# Patient Record
Sex: FEMALE | Race: BLACK OR AFRICAN AMERICAN | NOT HISPANIC OR LATINO | Employment: STUDENT | URBAN - METROPOLITAN AREA
[De-identification: names, ages, dates, MRNs, and addresses within clinical notes are randomized per-mention and may not be internally consistent; named-entity substitution may affect disease eponyms.]

---
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
## 2020-01-01 ENCOUNTER — NURSE TRIAGE (OUTPATIENT)
Dept: CALL CENTER | Facility: HOSPITAL | Age: 20
End: 2020-01-01

## 2020-01-01 NOTE — TELEPHONE ENCOUNTER
Reason for Disposition  • New-onset mild wheezing    Additional Information  • Negative: Wheezing and life-threatening allergic reaction to similar substance in the past  • Negative: Wheezing starts suddenly after allergic food, new medicine or bee sting  • Negative: Severe difficulty breathing (struggling for each breath, unable to speak or cry, making grunting noises with each breath, severe retractions) (Triage tip: Listen to the child's breathing.)  • Negative: Passed out  • Negative: Bluish (or gray) lips or face now  • Negative: Sounds like a life-threatening emergency to the triager  • Negative: Bronchiolitis or RSV diagnosed in last 2 weeks  • Negative: Previous diagnosis of asthma (or RAD) OR regular use of asthma medicines for wheezing  • Negative: [1] Age < 2 years AND [2] given albuterol inhaler or neb (Lane: Salbutamol) for home treatment within the last 2 weeks BUT [3] no diagnosis given and no history of regular use of asthma meds  • Negative: [1] Age > 2 years AND [2] given albuterol inhaler or neb (Lane: Salbutamol) for home treatment within the last 2 weeks BUT [3] no diagnosis given  • Negative: Doesn't fit the description of wheezing  • Negative: Severe wheezing (e.g., wheezing can be heard across the room)  • Negative: Choked on small object or food recently  • Negative: [1] Age < 12 weeks AND [2] fever 100.4 F (38.0 C) or higher rectally  • Negative: Age < 6 months old with wheezing  • Negative: [1] Age < 1 year AND [2] difficulty feeding AND [3] fluid intake < 50% of normal amount  • Negative: [1] Difficulty breathing (> 1 year old) AND [2] not severe (Triage tip: Listen to the child's breathing.)  • Negative: [1] Difficulty breathing (< 1 year old) AND [2] not severe AND [3] not relieved by cleaning out the nose (Triage tip: Listen to the child's breathing.)  • Negative: Ribs are pulling in with each breath (retractions)  • Negative: [1] Lips or face have turned bluish BUT [2] not  "present now  • Negative: Rapid breathing (Breaths/min > 60 if < 2 mo;  > 50 if 2-12 mo;  > 40 if 1-5 years old; > 30 if 6-11 years; and > 20 if > 12 years old)  • Negative: [1] Drinking very little AND [2] signs of dehydration (no urine > 12 hours, very dry mouth, no tears, etc.)  • Negative: [1] Fever AND [2] > 105 F (40.6 C) by any route OR axillary > 104 F (40 C)  • Negative: [1] Fever AND [2] weak immune system (sickle cell disease, HIV, splenectomy, chemotherapy, organ transplant, chronic oral steroids, etc)  • Negative: High-risk child (e.g., underlying heart, lung or severe neuromuscular disease)  • Negative: [1] Age < 1 year old AND [2] history of prematurity (< 37 weeks) or NICU stay  • Negative: Child sounds very sick or weak to the triager  • Negative: [1] Age< 1 year AND [2] refuses to breast or bottle feed for 2 or more feedings  • Negative: [1] Albuterol prescribed for this child in the past AND [2] caller has it and wants to use it for mild wheezing AND [3] diagnosis unknown    Answer Assessment - Initial Assessment Questions  Note to Triager - Respiratory Distress: Always rule out respiratory distress (also known as working hard to breathe or shortness of breath). Listen for grunting, stridor, wheezing, tachypnea in these calls. How to assess: Listen to the child's breathing early in your assessment. Reason: What you hear is often more valid than the caller's answers to your triage questions.  1. ONSET: \"When did the wheezing begin?\"        tonight  2. RESPIRATORY STATUS: \"Describe your child's breathing. What does it sound like?\" (e.g., wheezing, stridor, grunting, weak cry, unable to speak, retractions, rapid rate, cyanosis)      Denies retractions etc  3. FEEDING STATUS:  \"Is your child having difficulty with breast or bottle feeding?\"  If so, ask:  \"How long can he feed without stopping to take a breath?\"     Eating ok  4. ASSOCIATED VIRAL INFECTION: \"Does your child also have a cold, cough or " "fever?\"      Has ear infection, dx with pneumox 1  5. ASSOCIATED ALLERGIES: \"Does your child also have any allergies?\"       n/a  6. RECURRENT EPISODES: \"Has your child had other attacks of wheezing?\" If so, ask: \"When was the last time?\" and \"What happened that time?\"       no  7. FAMILY HISTORY: \"Does anyone in your family have asthma?\"       no  8. CHILD'S APPEARANCE: \"How sick is your child acting?\" \" What is he doing right now?\" If asleep, ask: \"How was he acting before he went to sleep?\"      resting    Protocols used: WHEEZING - OTHER THAN ASTHMA-PEDIATRIC-AH      "

## 2021-08-18 ENCOUNTER — OFFICE VISIT (OUTPATIENT)
Dept: OBSTETRICS AND GYNECOLOGY | Age: 21
End: 2021-08-18

## 2021-08-18 VITALS
DIASTOLIC BLOOD PRESSURE: 76 MMHG | BODY MASS INDEX: 24.55 KG/M2 | HEIGHT: 61 IN | WEIGHT: 130 LBS | SYSTOLIC BLOOD PRESSURE: 118 MMHG

## 2021-08-18 DIAGNOSIS — Z20.2 POSSIBLE EXPOSURE TO STD: Primary | ICD-10-CM

## 2021-08-18 PROCEDURE — 99385 PREV VISIT NEW AGE 18-39: CPT | Performed by: OBSTETRICS & GYNECOLOGY

## 2021-08-18 RX ORDER — LEVONORGESTREL AND ETHINYL ESTRADIOL 0.1-0.02MG
1 KIT ORAL DAILY
Qty: 84 TABLET | Refills: 3 | Status: SHIPPED | OUTPATIENT
Start: 2021-08-18 | End: 2022-07-25

## 2021-08-18 NOTE — PROGRESS NOTES
Routine Annual Visit    2021    Patient: Elsa Thrasher          MR#:[id number]      Chief Complaint   Patient presents with   • Gynecologic Exam     New pt, AE today. Interested in OCP. Pt states cycles are irregular at times.        History of Present Illness    20 y.o. female  who presents for annual exam.   She has some menstrual irregularity, occasionally skips a month but not usual  They are somewhat heavy and has significant cramping at times    She is going off to school at [employer] tomorrow.  She has been sexually active with one partner, no concern for STIs but does accept screening today.    Health Maintenance  Gardasil yes did receive      Patient's last menstrual period was 2021.  Obstetric History:  OB History        0    Para   0    Term   0       0    AB   0    Living   0       SAB   0    TAB   0    Ectopic   0    Molar   0    Multiple   0    Live Births   0               Menstrual History:     Patient's last menstrual period was 2021.       ________________________________________  There is no problem list on file for this patient.      History reviewed. No pertinent past medical history.    Family History   Problem Relation Age of Onset   • Hypertension Father        History reviewed. No pertinent surgical history.    Social History     Tobacco Use   Smoking Status Never Smoker   Smokeless Tobacco Never Used       currently has no medications in their medication list.  ________________________________________      Review of Systems   Constitutional: Negative for fever and unexpected weight change.   Respiratory: Negative for shortness of breath.    Cardiovascular: Negative for chest pain.   Gastrointestinal: Negative for abdominal pain, constipation and diarrhea.   Genitourinary: Positive for menstrual problem. Negative for frequency and urgency.   Hematological: Negative for adenopathy.   Psychiatric/Behavioral: Negative for dysphoric mood.       Objective   Physical  "Exam    /76   Ht 154.9 cm (61\")   Wt 59 kg (130 lb)   LMP 08/06/2021   Breastfeeding No   BMI 24.56 kg/m²    BP Readings from Last 3 Encounters:   08/18/21 118/76   12/04/20 127/82   05/18/18 102/78 (22 %, Z = -0.76 /  93 %, Z = 1.46)*     *BP percentiles are based on the 2017 AAP Clinical Practice Guideline for girls      Wt Readings from Last 3 Encounters:   08/18/21 59 kg (130 lb)   05/18/18 60.3 kg (133 lb) (68 %, Z= 0.46)*   03/31/17 61.1 kg (134 lb 12.8 oz) (74 %, Z= 0.63)*     * Growth percentiles are based on [facility] [place] (Girls, 2-20 Years) data.         BMI: Body mass index is 24.56 kg/m².       General:   alert, appears stated age and cooperative   Neck: No thyromegaly or LAD, no carotid bruit noted   Heart:: regular rate and rhythm, S1, S2 normal, no murmur, click, rub or gallop   Lungs: normal respiratory effort and auscultation   Abdomen: soft, non-tender, without masses or organomegaly   extremities No calf TTP, no edema     Assessment:    normal annual exam   STI screen  Contraception  dysmenorrhea    Plan:    Plan     []  Mammogram request made  []  PAP done  []  Labs:   [x]  GC/Chl/TV  []  DEXA scan   []  Referral for colonoscopy:     We reviewed form of contraception today and also treatment for dysmenorrhea.  Plan to start OCPs, but also recommended IUD or Nexplanon is more effective form of contraception also treatment for her dysmenorrhea.    Counseling  [x]  Nutrition  [x]  Physical activity/regular exercise   [x]  Healthy weight  []  Injury prevention  []  Smoking cessation  [x]  Substance misuse/abuse  [x]  Sexual behavior  [x]  STD prevention  [x]  Contraception  []  Dental health  []  Mental health  []  Immunization  []  Encouraged SBMAHESH Guzman MD  08/18/2021  15:12 EDT    "

## 2021-08-20 LAB
C TRACH RRNA SPEC QL NAA+PROBE: NEGATIVE
N GONORRHOEA RRNA SPEC QL NAA+PROBE: NEGATIVE
T VAGINALIS DNA SPEC QL NAA+PROBE: NEGATIVE

## 2021-08-22 NOTE — PROGRESS NOTES
Please notify that screening for gonorrhea, chlamydia, trichomonas was negative    Dinorah Guzman MD  8/22/2021  09:29 EDT

## 2021-08-23 ENCOUNTER — TELEPHONE (OUTPATIENT)
Dept: OBSTETRICS AND GYNECOLOGY | Age: 21
End: 2021-08-23

## 2021-12-11 ENCOUNTER — IMMUNIZATION (OUTPATIENT)
Dept: VACCINE CLINIC | Facility: HOSPITAL | Age: 21
End: 2021-12-11

## 2021-12-11 PROCEDURE — 91300 HC SARSCOV02 VAC 30MCG/0.3ML IM: CPT | Performed by: INTERNAL MEDICINE

## 2021-12-11 PROCEDURE — 0004A HC ADM SARSCOV2 30MCG/0.3ML BOOSTER: CPT | Performed by: INTERNAL MEDICINE

## 2022-07-25 RX ORDER — LEVONORGESTREL AND ETHINYL ESTRADIOL 0.1-0.02MG
KIT ORAL
Qty: 84 TABLET | Refills: 3 | Status: SHIPPED | OUTPATIENT
Start: 2022-07-25

## 2022-07-25 NOTE — TELEPHONE ENCOUNTER
Caller: Elsa Thrasher    Relationship: Self    Best call back number: [phone]    Requested Prescriptions:   Requested Prescriptions     Pending Prescriptions Disp Refills   • Lessina 0.1-20 MG-MCG per tablet [Pharmacy Med Name: LESSINA-28 TABLET] 84 tablet 3     Sig: TAKE ONE TABLET BY MOUTH DAILY        Pharmacy where request should be sent:   [facility]  [id number] [facility], [place] [id number]  PH. [phone]    Additional details provided by patient: PT IS OUT OF PRESCRIPTION, SHLD HAVE STARTED 7/24/22  Does the patient have less than a 3 day supply:  [x] Yes  [] No    Marie Cuello Rep   07/25/22 08:55 EDT

## 2022-08-17 ENCOUNTER — OFFICE VISIT (OUTPATIENT)
Dept: OBSTETRICS AND GYNECOLOGY | Age: 22
End: 2022-08-17

## 2022-08-17 VITALS
WEIGHT: 129 LBS | HEIGHT: 61 IN | BODY MASS INDEX: 24.35 KG/M2 | SYSTOLIC BLOOD PRESSURE: 116 MMHG | DIASTOLIC BLOOD PRESSURE: 74 MMHG

## 2022-08-17 DIAGNOSIS — Z30.41 ENCOUNTER FOR SURVEILLANCE OF CONTRACEPTIVE PILLS: ICD-10-CM

## 2022-08-17 DIAGNOSIS — Z01.419 ENCOUNTER FOR GYNECOLOGICAL EXAMINATION WITHOUT ABNORMAL FINDING: Primary | ICD-10-CM

## 2022-08-17 PROCEDURE — 99395 PREV VISIT EST AGE 18-39: CPT | Performed by: OBSTETRICS & GYNECOLOGY

## 2022-08-17 NOTE — PROGRESS NOTES
Routine Annual Visit    2022    Patient: Elsa Thrasher          MR#:[id number]      Chief Complaint   Patient presents with   • Gynecologic Exam     AE Today. Pt has no complaints       History of Present Illness    21 y.o. female  who presents for annual exam.   [employer], leaves next Saturday. Living off campus.   She has been sexually active this year, using condoms.  Happy with OCPs but considering Nexplanon.  She plans to go into sports management, and med management.        Patient's last menstrual period was 2022 (exact date).  Obstetric History:  OB History        0    Para   0    Term   0       0    AB   0    Living   0       SAB   0    IAB   0    Ectopic   0    Molar   0    Multiple   0    Live Births   0               Menstrual History:     Patient's last menstrual period was 2022 (exact date).       ________________________________________  There is no problem list on file for this patient.      History reviewed. No pertinent past medical history.    Family History   Problem Relation Age of Onset   • Hypertension Father    • Breast cancer Maternal Grandmother    • Prostate cancer Maternal Grandfather    • Ovarian cancer Neg Hx    • Uterine cancer Neg Hx    • Colon cancer Neg Hx        History reviewed. No pertinent surgical history.    Social History     Tobacco Use   Smoking Status Never Smoker   Smokeless Tobacco Never Used       has a current medication list which includes the following prescription(s): lessina.  ________________________________________      Review of Systems   Constitutional: Negative for fever and unexpected weight change.   Respiratory: Negative for shortness of breath.    Cardiovascular: Negative for chest pain.   Gastrointestinal: Negative for abdominal pain, constipation and diarrhea.   Genitourinary: Negative for frequency and urgency.   Hematological: Negative for adenopathy.   Psychiatric/Behavioral: Negative for dysphoric mood.       Objective  "  Physical Exam    /74   Ht 154.9 cm (61\")   Wt 58.5 kg (129 lb)   LMP 08/16/2022 (Exact Date)   Breastfeeding No   BMI 24.37 kg/m²    BP Readings from Last 3 Encounters:   08/17/22 116/74   08/18/21 118/76   12/04/20 127/82      Wt Readings from Last 3 Encounters:   08/17/22 58.5 kg (129 lb)   08/18/21 59 kg (130 lb)   05/18/18 60.3 kg (133 lb) (68 %, Z= 0.46)*     * Growth percentiles are based on CDC (Girls, 2-20 Years) data.         BMI: Body mass index is 24.37 kg/m².       General:   alert, appears stated age and cooperative   Neck: No thyromegaly or LAD   Abdomen: soft, non-tender, without masses or organomegaly   Breast: inspection negative, no nipple discharge or bleeding, no masses or nodularity palpable   Urethra and bladder: urethral meatus normal; bladder nontender to palpation;   Vulva: normal, Bartholin's, Urethra, [place]'s normal   Vagina: normal mucosa, normal discharge, scant blood   Cervix: no lesions and nulliparous appearance   Uterus: normal size and anteverted   Adnexa: normal adnexa and no mass, fullness, tenderness       Assessment:    normal annual exam   Contraception management  Screening for STI    Plan:    Plan     []  Mammogram request made  [x]  PAP done  []  Labs:   [x]  GC/Chl/TV  []  DEXA scan   []  Referral for colonoscopy:     Information given regarding Nexplanon.  She may want to switch from OCPs to Nexplanon.    Counseling  [x]  Nutrition  [x]  Physical activity/regular exercise   [x]  Healthy weight  []  Injury prevention  []  Smoking cessation  []  Substance misuse/abuse  [x]  Sexual behavior  [x]  STD prevention  [x]  Contraception  []  Dental health  []  Mental health  []  Immunization  [x]  Encouraged SBE      Dinorah Guzman MD  08/17/2022  13:10 EDT    "

## 2022-08-23 LAB
C TRACH RRNA CVX QL NAA+PROBE: NEGATIVE
CONV .: NORMAL
CYTOLOGIST CVX/VAG CYTO: NORMAL
CYTOLOGY CVX/VAG DOC CYTO: NORMAL
CYTOLOGY CVX/VAG DOC THIN PREP: NORMAL
DX ICD CODE: NORMAL
HIV 1 & 2 AB SER-IMP: NORMAL
Lab: NORMAL
N GONORRHOEA RRNA CVX QL NAA+PROBE: NEGATIVE
OTHER STN SPEC: NORMAL
RECOM F/U CVX/VAG CYTO: NORMAL
STAT OF ADQ CVX/VAG CYTO-IMP: NORMAL
T VAGINALIS RRNA SPEC QL NAA+PROBE: NEGATIVE

## 2022-08-24 NOTE — PROGRESS NOTES
Please notify:  Your Pap was insufficient, there were not enough cells for the pathologist to analyze.  I would recommend repeating the Pap in about 3 or 4 months.  Testing for gonorrhea, chlamydia, trichomonas was negative.    Dinorah Guzman MD  8/24/2022  12:58 EDT

## 2022-11-23 ENCOUNTER — OFFICE VISIT (OUTPATIENT)
Dept: OBSTETRICS AND GYNECOLOGY | Age: 22
End: 2022-11-23

## 2022-11-23 VITALS
DIASTOLIC BLOOD PRESSURE: 78 MMHG | WEIGHT: 124 LBS | HEIGHT: 61 IN | BODY MASS INDEX: 23.41 KG/M2 | SYSTOLIC BLOOD PRESSURE: 124 MMHG

## 2022-11-23 DIAGNOSIS — R87.615 ENCOUNTER FOR REPEAT PAP SMEAR DUE TO PREVIOUS INSUFF CERVICAL CELLS: Primary | ICD-10-CM

## 2022-11-23 NOTE — PROGRESS NOTES
"Subjective   Elsa Thrasher is a 22 y.o. female is being seen today for   Chief Complaint   Patient presents with   • Follow-up     Repeat pap due to not enough cells     .    History of Present Illness     Patient of Dr Guzman  Patient here for repeat pap due to insufficient cells  Gc/CHL was negative  No problems or concerns today      The following portions of the patient's history were reviewed and updated as appropriate: allergies, current medications, past family history, past medical history, past social history, past surgical history and problem list.    /78   Ht 154.9 cm (61\")   Wt 56.2 kg (124 lb)   LMP 11/08/2022 (Exact Date)   BMI 23.43 kg/m²         Review of Systems   Constitutional: Negative.    HENT: Negative.    Eyes: Negative.    Respiratory: Negative.    Cardiovascular: Negative.    Gastrointestinal: Negative.    Endocrine: Negative.    Genitourinary: Negative.    Musculoskeletal: Negative.    Skin: Negative.    Allergic/Immunologic: Negative.    Neurological: Negative.    Hematological: Negative.    Psychiatric/Behavioral: Negative.        Objective   Physical Exam  Constitutional:       Appearance: She is well-developed.   Genitourinary:     Vagina: Normal.      Cervix: No cervical motion tenderness, discharge or friability.      Comments: No bleeding following pap  Skin:     General: Skin is warm and dry.   Neurological:      Mental Status: She is alert and oriented to person, place, and time.           Assessment & Plan   Diagnoses and all orders for this visit:    1. Encounter for repeat Pap smear due to previous insuff cervical cells (Primary)  -     IGP,rfx Aptima HPV All Pth; Future      Repeat pap complete and sent  Follow up AE and PRN           Total time spent today with Elsa  was 20-29 minutes (level 3).  Greater than 50% of the time was spent coordinating care, answering her questions and counseling regarding pathophysiology of her presenting problem along with plans for any " diagnositc work-up and treatment.

## 2022-11-24 ENCOUNTER — DOCUMENTATION (OUTPATIENT)
Dept: INTERNAL MEDICINE | Facility: CLINIC | Age: 22
End: 2022-11-24

## 2022-11-24 NOTE — PROGRESS NOTES
"           Name: Elsa Thrasher  :  2000    Call Complaint/Concern:          Elsa Thrasher is a 22 y.o. female patient of Milan Whitlock MD who has called for:     Patient's mother: Hi Thrasher called regarding this patient.   Patient is not yet established in our office.     Advised via Doximity that medical recommendation can not be provided to unknown patient. Message read confirmation received.     Patient has upcoming appointment to establish care with Omi on 22.       Yonatan \"Billy\" Zachary, APRN   22    Objective:          Current Outpatient Medications:   •  Lessina 0.1-20 MG-MCG per tablet, TAKE ONE TABLET BY MOUTH DAILY, Disp: 84 tablet, Rfl: 3    Office Visit on 2022   Component Date Value Ref Range Status   • Diagnosis 2022 Comment   Final    Comment: UNSATISFACTORY FOR EVALUATION.  SPECIMEN REPROCESSED FOR INTERPRETATION USING GLACIAL ACETIC ACID  (GAA).     • Recommendation: 2022 Comment   Final    Suggest follow up as clinically appropriate.   • Specimen adequacy: 2022 Comment   Final    Comment: Specimen processed and examined but unsatisfactory for evaluation of  epithelial abnormality because of insufficient cellularity.     • Clinician Provided ICD-10: 2022 Comment   Final    Z01.419   • Performed by: 2022 Comment   Final    Marjan Enamorado, Cytotechnologist (ASCP)   • QC reviewed by: 2022 Comment   Final    Tracy Elizondo, Supervisory Cytotechnologist (ASCP)   • . 2022 .   Final   • Note: 2022 Comment   Final    Comment: The Pap smear is a screening test designed to aid in the detection of  premalignant and malignant conditions of the uterine cervix.  It is not a  diagnostic procedure and should not be used as the sole means of detecting  cervical cancer.  Both false-positive and false-negative reports do occur.     • Method: 2022 Comment   Final    Comment: This liquid based ThinPrep(R) pap test was screened with " the  use of an image guided system.     • Conv .conv 08/17/2022 Comment   Final    Comment: The HPV DNA reflex criteria were not met with this specimen result  therefore, no HPV testing was performed.     • Chlamydia, Nuc. Acid Amp 08/17/2022 Negative  Negative Final   • Gonococcus by Nucleic Acid Amp 08/17/2022 Negative  Negative Final   • Trichomonas vaginosis 08/17/2022 Negative  Negative Final

## 2022-12-01 LAB
CONV .: NORMAL
CYTOLOGIST CVX/VAG CYTO: NORMAL
CYTOLOGY CVX/VAG DOC CYTO: NORMAL
CYTOLOGY CVX/VAG DOC THIN PREP: NORMAL
DX ICD CODE: NORMAL
HIV 1 & 2 AB SER-IMP: NORMAL
OTHER STN SPEC: NORMAL
STAT OF ADQ CVX/VAG CYTO-IMP: NORMAL

## 2022-12-19 ENCOUNTER — OFFICE VISIT (OUTPATIENT)
Dept: FAMILY MEDICINE CLINIC | Facility: CLINIC | Age: 22
End: 2022-12-19

## 2022-12-19 VITALS
HEART RATE: 88 BPM | RESPIRATION RATE: 18 BRPM | OXYGEN SATURATION: 98 % | HEIGHT: 62 IN | BODY MASS INDEX: 22.63 KG/M2 | SYSTOLIC BLOOD PRESSURE: 138 MMHG | WEIGHT: 123 LBS | DIASTOLIC BLOOD PRESSURE: 82 MMHG | TEMPERATURE: 98 F

## 2022-12-19 DIAGNOSIS — R59.9 SWOLLEN LYMPH NODES: Primary | ICD-10-CM

## 2022-12-19 DIAGNOSIS — R59.9 SWOLLEN LYMPH NODES: ICD-10-CM

## 2022-12-19 DIAGNOSIS — Z00.00 ANNUAL PHYSICAL EXAM: ICD-10-CM

## 2022-12-19 PROCEDURE — 99395 PREV VISIT EST AGE 18-39: CPT | Performed by: FAMILY MEDICINE

## 2022-12-19 NOTE — PROGRESS NOTES
Chief Complaint  establish care well check up    Subjective            Elsa Thrasher presents to [facility]  History of Present Illness    23 yo female present to establish care and annual visit. She is nw to me and this office.   Patient reporting that health is doing well overall.  Patient is here today for annual physical.  Eating a balanced diet.    Labs: Due for routine labs    Pap- normal 11/23/22, on birthcontrol  Immunization   Dental- need to make an appt.   Vision- stable         PHQ-2 Depression Screening  Little interest or pleasure in doing things? 0-->not at all   Feeling down, depressed, or hopeless? 0-->not at all   PHQ-2 Total Score 0     Social History     Socioeconomic History   • Marital status: Single   Tobacco Use   • Smoking status: Never   • Smokeless tobacco: Never   Vaping Use   • Vaping Use: Never used   Substance and Sexual Activity   • Alcohol use: No   • Drug use: No   • Sexual activity: Yes     Partners: Male     Birth control/protection: OCP, Condom         Review of Systems   Constitutional: Negative for chills and fever.   HENT: Negative for congestion, rhinorrhea, sneezing and sore throat.    Eyes: Negative for visual disturbance.   Respiratory: Negative for cough and shortness of breath.    Cardiovascular: Negative for chest pain.   Gastrointestinal: Negative for abdominal pain, constipation, diarrhea, nausea and vomiting.   Genitourinary: Negative for difficulty urinating, vaginal bleeding and vaginal discharge.   Musculoskeletal: Positive for arthralgias.        Knees   Skin: Negative for rash.   Neurological: Negative for dizziness and numbness.   Hematological: Does not bruise/bleed easily.   Psychiatric/Behavioral: Negative for dysphoric mood and sleep disturbance.         Objective   Vital Signs:   /82 (BP Location: Left arm, Patient Position: Sitting, Cuff Size: Adult)   Pulse 88   Temp 98 °F (36.7 °C) (Infrared)   Resp 18   Ht 157.5 cm  "(62\")   Wt 55.8 kg (123 lb)   SpO2 98%   BMI 22.50 kg/m²     Physical Exam  Constitutional:       General: She is not in acute distress.     Appearance: She is not ill-appearing.   HENT:      Head: Normocephalic.      Right Ear: Tympanic membrane normal.      Left Ear: Tympanic membrane normal.      Mouth/Throat:      Mouth: Mucous membranes are moist.      Pharynx: Oropharynx is clear. No oropharyngeal exudate.   Eyes:      Conjunctiva/sclera: Conjunctivae normal.   Cardiovascular:      Rate and Rhythm: Regular rhythm.      Heart sounds: Normal heart sounds.   Pulmonary:      Effort: No respiratory distress.      Breath sounds: Normal breath sounds. No wheezing.   Abdominal:      General: Bowel sounds are normal. There is no distension.      Palpations: Abdomen is soft.      Tenderness: There is no abdominal tenderness.   Musculoskeletal:      Cervical back: Neck supple.      Right lower leg: No edema.      Left lower leg: No edema.   Lymphadenopathy:      Cervical: No cervical adenopathy.      Lower Body: Right inguinal adenopathy present. Left inguinal adenopathy present.   Neurological:      Mental Status: She is alert and oriented to person, place, and time.   Psychiatric:         Mood and Affect: Mood normal.        Result Review :   The following data was reviewed by: Angi Braga MD on 12/19/2022:             Current Outpatient Medications:   •  Lessina 0.1-20 MG-MCG per tablet, TAKE ONE TABLET BY MOUTH DAILY, Disp: 84 tablet, Rfl: 3     Assessment and Plan    Diagnoses and all orders for this visit:    1. Swollen lymph nodes (Primary)  -     Cancel: CBC w AUTO Differential; Future  -     Cancel: Basic metabolic panel; Future  -     Basic metabolic panel; Future  -     CBC w AUTO Differential; Future  -     US Pelvis Complete; Future    2. Annual physical exam      Normal period and BM   Initaly pain full swollen lymph nodes. No longer tender  Seen by gyn a month ago, normal pap.   Cbc           The " patient was counseled regarding nutrition, physical activity, healthy weight, injury prevention, misuse of tobacco, alcohol and illicit drugs, mental health, immunizations, and screenings.               Follow Up   Return in about 1 year (around 12/19/2023) for Annual physical.  Patient was given instructions and counseling regarding her condition or for health maintenance advice. Please see specific information pulled into the AVS if appropriate.

## 2022-12-20 PROBLEM — R59.9 SWOLLEN LYMPH NODES: Status: ACTIVE | Noted: 2022-12-20

## 2022-12-20 LAB
BASOPHILS # BLD AUTO: 0 X10E3/UL (ref 0–0.2)
BASOPHILS NFR BLD AUTO: 0 %
BUN SERPL-MCNC: 12 MG/DL (ref 6–20)
BUN/CREAT SERPL: 16 (ref 9–23)
CALCIUM SERPL-MCNC: 9.6 MG/DL (ref 8.7–10.2)
CHLORIDE SERPL-SCNC: 104 MMOL/L (ref 96–106)
CO2 SERPL-SCNC: 21 MMOL/L (ref 20–29)
CREAT SERPL-MCNC: 0.73 MG/DL (ref 0.57–1)
EGFRCR SERPLBLD CKD-EPI 2021: 119 ML/MIN/1.73
EOSINOPHIL # BLD AUTO: 0.1 X10E3/UL (ref 0–0.4)
EOSINOPHIL NFR BLD AUTO: 1 %
ERYTHROCYTE [DISTWIDTH] IN BLOOD BY AUTOMATED COUNT: 12.8 % (ref 11.7–15.4)
GLUCOSE SERPL-MCNC: 89 MG/DL (ref 70–99)
HCT VFR BLD AUTO: 40.3 % (ref 34–46.6)
HGB BLD-MCNC: 12.9 G/DL (ref 11.1–15.9)
IMM GRANULOCYTES # BLD AUTO: 0 X10E3/UL (ref 0–0.1)
IMM GRANULOCYTES NFR BLD AUTO: 0 %
LYMPHOCYTES # BLD AUTO: 1.2 X10E3/UL (ref 0.7–3.1)
LYMPHOCYTES NFR BLD AUTO: 20 %
MCH RBC QN AUTO: 27.8 PG (ref 26.6–33)
MCHC RBC AUTO-ENTMCNC: 32 G/DL (ref 31.5–35.7)
MCV RBC AUTO: 87 FL (ref 79–97)
MONOCYTES # BLD AUTO: 0.3 X10E3/UL (ref 0.1–0.9)
MONOCYTES NFR BLD AUTO: 5 %
NEUTROPHILS # BLD AUTO: 4.5 X10E3/UL (ref 1.4–7)
NEUTROPHILS NFR BLD AUTO: 74 %
PLATELET # BLD AUTO: 328 X10E3/UL (ref 150–450)
POTASSIUM SERPL-SCNC: 4.1 MMOL/L (ref 3.5–5.2)
RBC # BLD AUTO: 4.64 X10E6/UL (ref 3.77–5.28)
SODIUM SERPL-SCNC: 139 MMOL/L (ref 134–144)
WBC # BLD AUTO: 6.1 X10E3/UL (ref 3.4–10.8)

## 2022-12-23 ENCOUNTER — TELEPHONE (OUTPATIENT)
Dept: FAMILY MEDICINE CLINIC | Facility: CLINIC | Age: 22
End: 2022-12-23

## 2022-12-23 NOTE — TELEPHONE ENCOUNTER
Caller: Elsa Thrasher    Relationship: Self    Best call back number:[phone]    What orders are you requesting (i.e. lab or imaging): ULTRASOUND OF PELVIS     In what timeframe would the patient need to come in: AS SOON AS POSSIBLE     Where will you receive your lab/imaging services: UNKNOWN

## 2022-12-23 NOTE — TELEPHONE ENCOUNTER
It is scheduled on 12/30, looks like they were able to schedule the appointment right after they called here about it.

## 2022-12-30 ENCOUNTER — HOSPITAL ENCOUNTER (OUTPATIENT)
Dept: ULTRASOUND IMAGING | Facility: HOSPITAL | Age: 22
Discharge: HOME | End: 2022-12-30

## 2022-12-30 DIAGNOSIS — R59.9 SWOLLEN LYMPH NODES: ICD-10-CM

## 2022-12-30 PROCEDURE — 76882 US LMTD JT/FCL EVL NVASC XTR: CPT

## 2023-05-15 ENCOUNTER — HOSPITAL ENCOUNTER (EMERGENCY)
Facility: HOSPITAL | Age: 23
Discharge: HOME | End: 2023-05-15
Attending: EMERGENCY MEDICINE
Payer: COMMERCIAL

## 2023-05-15 VITALS
HEART RATE: 99 BPM | SYSTOLIC BLOOD PRESSURE: 115 MMHG | BODY MASS INDEX: 22.08 KG/M2 | RESPIRATION RATE: 16 BRPM | DIASTOLIC BLOOD PRESSURE: 73 MMHG | OXYGEN SATURATION: 98 % | WEIGHT: 120 LBS | HEIGHT: 62 IN | TEMPERATURE: 98.3 F

## 2023-05-15 DIAGNOSIS — R11.2 NAUSEA AND VOMITING, UNSPECIFIED VOMITING TYPE: Primary | ICD-10-CM

## 2023-05-15 DIAGNOSIS — E86.0 DEHYDRATION: ICD-10-CM

## 2023-05-15 LAB
ALBUMIN SERPL-MCNC: 4.6 G/DL (ref 3.5–5.2)
ALBUMIN/GLOB SERPL: 1.3 G/DL
ALP SERPL-CCNC: 46 U/L (ref 39–117)
ALT SERPL W P-5'-P-CCNC: 14 U/L (ref 1–33)
ANION GAP SERPL CALCULATED.3IONS-SCNC: 12.3 MMOL/L (ref 5–15)
ANION GAP SERPL CALCULATED.3IONS-SCNC: 18.1 MMOL/L (ref 5–15)
AST SERPL-CCNC: 21 U/L (ref 1–32)
ATMOSPHERIC PRESS: 748.1 MMHG
B-HCG UR QL: NEGATIVE
BASE EXCESS BLDV CALC-SCNC: -6.3 MMOL/L (ref -2–2)
BASOPHILS # BLD AUTO: 0.01 10*3/MM3 (ref 0–0.2)
BASOPHILS NFR BLD AUTO: 0.1 % (ref 0–1.5)
BDY SITE: ABNORMAL
BILIRUB SERPL-MCNC: 1.5 MG/DL (ref 0–1.2)
BILIRUB UR QL STRIP: NEGATIVE
BUN SERPL-MCNC: 7 MG/DL (ref 6–20)
BUN SERPL-MCNC: 9 MG/DL (ref 6–20)
BUN/CREAT SERPL: 10.8 (ref 7–25)
BUN/CREAT SERPL: 12.3 (ref 7–25)
CALCIUM SPEC-SCNC: 8 MG/DL (ref 8.6–10.5)
CALCIUM SPEC-SCNC: 9.7 MG/DL (ref 8.6–10.5)
CHLORIDE SERPL-SCNC: 100 MMOL/L (ref 98–107)
CHLORIDE SERPL-SCNC: 105 MMOL/L (ref 98–107)
CLARITY UR: ABNORMAL
CO2 SERPL-SCNC: 14.9 MMOL/L (ref 22–29)
CO2 SERPL-SCNC: 17.7 MMOL/L (ref 22–29)
COLOR UR: YELLOW
CREAT SERPL-MCNC: 0.65 MG/DL (ref 0.57–1)
CREAT SERPL-MCNC: 0.73 MG/DL (ref 0.57–1)
DEPRECATED RDW RBC AUTO: 41.5 FL (ref 37–54)
EGFRCR SERPLBLD CKD-EPI 2021: 119.4 ML/MIN/1.73
EGFRCR SERPLBLD CKD-EPI 2021: 127.8 ML/MIN/1.73
EOSINOPHIL # BLD AUTO: 0.01 10*3/MM3 (ref 0–0.4)
EOSINOPHIL NFR BLD AUTO: 0.1 % (ref 0.3–6.2)
ERYTHROCYTE [DISTWIDTH] IN BLOOD BY AUTOMATED COUNT: 12.9 % (ref 12.3–15.4)
GLOBULIN UR ELPH-MCNC: 3.5 GM/DL
GLUCOSE SERPL-MCNC: 127 MG/DL (ref 65–99)
GLUCOSE SERPL-MCNC: 171 MG/DL (ref 65–99)
GLUCOSE UR STRIP-MCNC: NEGATIVE MG/DL
HCO3 BLDV-SCNC: 17.5 MMOL/L (ref 22–26)
HCT VFR BLD AUTO: 36 % (ref 34–46.6)
HGB BLD-MCNC: 12.3 G/DL (ref 12–15.9)
HGB UR QL STRIP.AUTO: ABNORMAL
IMM GRANULOCYTES # BLD AUTO: 0.02 10*3/MM3 (ref 0–0.05)
IMM GRANULOCYTES NFR BLD AUTO: 0.2 % (ref 0–0.5)
KETONES UR QL STRIP: ABNORMAL
LEUKOCYTE ESTERASE UR QL STRIP.AUTO: NEGATIVE
LYMPHOCYTES # BLD AUTO: 0.5 10*3/MM3 (ref 0.7–3.1)
LYMPHOCYTES NFR BLD AUTO: 4.3 % (ref 19.6–45.3)
MCH RBC QN AUTO: 29.6 PG (ref 26.6–33)
MCHC RBC AUTO-ENTMCNC: 34.2 G/DL (ref 31.5–35.7)
MCV RBC AUTO: 86.5 FL (ref 79–97)
MODALITY: ABNORMAL
MONOCYTES # BLD AUTO: 0.18 10*3/MM3 (ref 0.1–0.9)
MONOCYTES NFR BLD AUTO: 1.6 % (ref 5–12)
NEUTROPHILS NFR BLD AUTO: 10.8 10*3/MM3 (ref 1.7–7)
NEUTROPHILS NFR BLD AUTO: 93.7 % (ref 42.7–76)
NITRITE UR QL STRIP: NEGATIVE
PCO2 BLDV: 28.9 MM HG (ref 41–51)
PH BLDV: 7.39 PH UNITS (ref 7.31–7.41)
PH UR STRIP.AUTO: 6.5 [PH] (ref 5–8)
PLATELET # BLD AUTO: 323 10*3/MM3 (ref 140–450)
PMV BLD AUTO: 9.8 FL (ref 6–12)
PO2 BLDV: 71.3 MM HG (ref 35–42)
POTASSIUM SERPL-SCNC: 3.4 MMOL/L (ref 3.5–5.2)
POTASSIUM SERPL-SCNC: 3.6 MMOL/L (ref 3.5–5.2)
PROT SERPL-MCNC: 8.1 G/DL (ref 6–8.5)
PROT UR QL STRIP: ABNORMAL
QT INTERVAL: 393 MS
RBC # BLD AUTO: 4.16 10*6/MM3 (ref 3.77–5.28)
SODIUM SERPL-SCNC: 133 MMOL/L (ref 136–145)
SODIUM SERPL-SCNC: 135 MMOL/L (ref 136–145)
SP GR UR STRIP: 1.02 (ref 1–1.03)
UROBILINOGEN UR QL STRIP: ABNORMAL
WBC NRBC COR # BLD: 11.52 10*3/MM3 (ref 3.4–10.8)

## 2023-05-15 PROCEDURE — 81025 URINE PREGNANCY TEST: CPT | Performed by: EMERGENCY MEDICINE

## 2023-05-15 PROCEDURE — 96375 TX/PRO/DX INJ NEW DRUG ADDON: CPT

## 2023-05-15 PROCEDURE — 96374 THER/PROPH/DIAG INJ IV PUSH: CPT

## 2023-05-15 PROCEDURE — 93005 ELECTROCARDIOGRAM TRACING: CPT | Performed by: EMERGENCY MEDICINE

## 2023-05-15 PROCEDURE — 99283 EMERGENCY DEPT VISIT LOW MDM: CPT

## 2023-05-15 PROCEDURE — 25010000002 DIPHENHYDRAMINE PER 50 MG: Performed by: EMERGENCY MEDICINE

## 2023-05-15 PROCEDURE — 82803 BLOOD GASES ANY COMBINATION: CPT | Performed by: EMERGENCY MEDICINE

## 2023-05-15 PROCEDURE — 25010000002 LORAZEPAM PER 2 MG: Performed by: EMERGENCY MEDICINE

## 2023-05-15 PROCEDURE — 81003 URINALYSIS AUTO W/O SCOPE: CPT | Performed by: EMERGENCY MEDICINE

## 2023-05-15 PROCEDURE — 96361 HYDRATE IV INFUSION ADD-ON: CPT

## 2023-05-15 PROCEDURE — 25010000002 METOCLOPRAMIDE PER 10 MG: Performed by: EMERGENCY MEDICINE

## 2023-05-15 PROCEDURE — 85025 COMPLETE CBC W/AUTO DIFF WBC: CPT | Performed by: EMERGENCY MEDICINE

## 2023-05-15 PROCEDURE — 80053 COMPREHEN METABOLIC PANEL: CPT | Performed by: EMERGENCY MEDICINE

## 2023-05-15 RX ORDER — PROMETHAZINE HYDROCHLORIDE 25 MG/1
25 TABLET ORAL EVERY 6 HOURS PRN
Qty: 20 TABLET | Refills: 0 | Status: SHIPPED | OUTPATIENT
Start: 2023-05-15

## 2023-05-15 RX ORDER — LORAZEPAM 2 MG/ML
0.5 INJECTION INTRAMUSCULAR ONCE
Status: COMPLETED | OUTPATIENT
Start: 2023-05-15 | End: 2023-05-15

## 2023-05-15 RX ORDER — METOCLOPRAMIDE HYDROCHLORIDE 5 MG/ML
10 INJECTION INTRAMUSCULAR; INTRAVENOUS ONCE
Status: COMPLETED | OUTPATIENT
Start: 2023-05-15 | End: 2023-05-15

## 2023-05-15 RX ORDER — DIPHENHYDRAMINE HYDROCHLORIDE 50 MG/ML
12.5 INJECTION INTRAMUSCULAR; INTRAVENOUS ONCE
Status: COMPLETED | OUTPATIENT
Start: 2023-05-15 | End: 2023-05-15

## 2023-05-15 RX ORDER — DICYCLOMINE HCL 20 MG
20 TABLET ORAL EVERY 6 HOURS PRN
Qty: 20 TABLET | Refills: 0 | Status: SHIPPED | OUTPATIENT
Start: 2023-05-15

## 2023-05-15 RX ORDER — ONDANSETRON 4 MG/1
4 TABLET, ORALLY DISINTEGRATING ORAL EVERY 6 HOURS PRN
Qty: 20 TABLET | Refills: 0 | Status: SHIPPED | OUTPATIENT
Start: 2023-05-15

## 2023-05-15 RX ADMIN — DIPHENHYDRAMINE HYDROCHLORIDE 12.5 MG: 50 INJECTION INTRAMUSCULAR; INTRAVENOUS at 08:10

## 2023-05-15 RX ADMIN — SODIUM CHLORIDE 1000 ML: 0.9 INJECTION, SOLUTION INTRAVENOUS at 08:10

## 2023-05-15 RX ADMIN — METOCLOPRAMIDE 10 MG: 5 INJECTION, SOLUTION INTRAMUSCULAR; INTRAVENOUS at 08:10

## 2023-05-15 RX ADMIN — SODIUM CHLORIDE 1000 ML: 9 INJECTION, SOLUTION INTRAVENOUS at 08:40

## 2023-05-15 RX ADMIN — LORAZEPAM 0.5 MG: 2 INJECTION INTRAMUSCULAR; INTRAVENOUS at 09:32

## 2023-05-15 RX ADMIN — SODIUM CHLORIDE 500 ML: 9 INJECTION, SOLUTION INTRAVENOUS at 10:03

## 2023-05-15 NOTE — DISCHARGE INSTRUCTIONS
Today your exam is consistent with viral gastroenteritis.  You are also noted to be dehydrated.    I did send in 2 different antinausea medications as well as a third medication for abdominal cramping and diarrhea.  Please take as directed    Please look over the dietary restrictions on the nausea and vomiting handout.    I would like you to be seen by your primary care physician in 5 to 7 days.  I would like them to repeat a basic metabolic panel which is an electrolyte panel.  Specifically your CO2 was slightly low today from your dehydration.  Your glucose likewise was minimally elevated, presumably secondary to stress reaction.    Return anytime for worsening symptoms    Please read all of the instructions in this handout.  If you receive prescriptions please fill them and take them as directed.  Please call your primary care physician for follow-up appointment in the next 5 to 7 days.  If you do not have a physician you may call the [facility] referral line at [phone].    You may return to the emergency department at any time for any concerns such as worsening symptoms.  If you received a work or school note it will be printed at the back of this packet.

## 2023-05-15 NOTE — FSED PROVIDER NOTE
Subjective   History of Present Illness  The patient is a 22-year-old female.  She presents with an approximate 8 to 10-hour history of multiple episodes of nausea vomiting and diarrhea.  No known fever.  She does admit to some epigastric and bilateral upper quadrant abdominal cramping.  At this time the nausea and vomiting are worse than the diarrhea.  No treatment prior to arrival.  No dysuria no hematuria.  She is not known to be pregnant no sick contacts within the house or friend.        Review of Systems   Constitutional: No fevers, chills, sweats unless otherwise documented in HPI  Eyes: No recent visual problems, eye discharge, eye pain, redness unless otherwise documented in HPI  HEENT: No ear pain, nasal congestion, sore throat, voice changes unless otherwise documented in HPI  Respiratory: No shortness of breath, cough, pain on breathing, sputum production unless otherwise documented in HPI  Cardiovascular: No chest pain, palpitations, syncope, orthopnea unless otherwise documented in HPI  Gastrointestinal: No nausea, vomiting, diarrhea, constipation unless otherwise documented in HPI  Genitourinary: No hematuria, dysuria, incontinence unless otherwise documented in HPI  Endocrine: Negative for excessive thirst, excessive hunger, excessive urination, heat or cold intolerance unless otherwise documented in HPI  Musculoskeletal: No back pain, neck pain, joint pain, muscle pain, decreased range of motion unless otherwise documented in HPI  Integumentary: No rash, pruritus, abrasion, lesions unless otherwise documented in HPI  Neurologic: No weakness, numbness, frequent headaches, tremors unless otherwise documented in HPI  Psychiatric: No anxiety, depression, mood changes, hallucinations unless otherwise documented in HPI        History reviewed. No pertinent past medical history.    Allergies   Allergen Reactions   • Peanut-Containing Drug Products        History reviewed. No pertinent surgical  history.    Family History   Problem Relation Age of Onset   • Hypertension Father    • Factor V Leiden deficiency Father    • Breast cancer Maternal Grandmother    • Prostate cancer Maternal Grandfather    • Ovarian cancer Neg Hx    • Uterine cancer Neg Hx    • Colon cancer Neg Hx        Social History     Socioeconomic History   • Marital status: Single   Tobacco Use   • Smoking status: Never   • Smokeless tobacco: Never   Vaping Use   • Vaping Use: Never used   Substance and Sexual Activity   • Alcohol use: No   • Drug use: No   • Sexual activity: Yes     Partners: Male     Birth control/protection: OCP, Condom           Objective   Physical Exam   Vital signs: Reviewed in nurses notes    General: Awake alert no acute distress    HEENT: Normocephalic/atraumatic nasopharynx clear oropharynx clear and moist]    Neck:   Supple without lymphadenopathy    Respiratory:   Nonlabored respirations.  Clear to auscultation bilaterally.  Equal breath sounds bilaterally.  No wheezes or stridor noted.    Cardiovascular: Regular rate and rhythm.  No murmur.  Equal pulses in bilateral lower extremities without edema.    Abdomen: Very soft nondistended.  Mild epigastric and bilateral upper quadrant tenderness to deep palpation no guarding no rebound    Skin:   Warm and dry.  No rashes noted    Musculoskeletal: No tenderness x4 extremities.  Atraumatic x 4 extremities    Neurological examination: Patient is awake alert oriented x4.  Speech is normal.  No facial palsy.  No focal motor or sensory deficits.  ECG 12 Lead      Date/Time: 5/15/2023 9:33 AM  Performed by: Artur Chadwick MD  Authorized by: Artur Chadwick MD   Interpreted by physician  Rhythm: sinus rhythm  Comments: Normal sinus rhythm rate is 82.  No acute ischemia noted                 ED Course  ED Course as of 05/15/23 1122   Mon May 15, 2023   1012 Patient is resting comfortably at this time.  All labs have been reviewed.  Patient was noted to be acidotic  on initial CMP.  Her CO2 is improved significantly after 2 L of isotonic fluid.  Glucose still slightly elevated at 127.  I do believe this elevation of glucose is secondary to stress reaction.  She has been given a total of 2.5 L of isotonic fluid.    Plan: I did speak with her and her mother and will send her home both with Phenergan and Zofran as well as Bentyl.  We will give her very strict return precautions.  I did also ask her mother to have her seen in approximately 7 to 10 days to repeat a basic metabolic panel just to recheck her electrolytes. [TC]   1119 Patient did report 1 episode of chest tightness after returning from the restroom.  EKG was unremarkable.  She did receive 0.5 mg of IV Ativan with complete relief [TC]      ED Course User Index  [TC] Artur Chadwick MD                                           [facility]     Differential Diagnosis: Viral gastroenteritis, dehydration, urinary tract infection    ED Course: Appropriate physical examination was performed.  IV was established and patient was ultimately hydrated with 2.5 L of isotonic fluid.  Initially she was noted to have decreased CO2 with mild elevation of glucose.  I believe this does represent stress reaction.  We did repeat a VBG after she was hydrated with 2 L and this was very reassuring.   Results of patient's evaluation were discussed with the patient.  We also did discuss the treatment plan as well as appropriate return precautions.    My EKG Interpretation: N/A    My CT Interpretation: N/A    My Xray interpreation:   N/A    Decision rules/scores evaluated: N/A    Discussed with : N/A    Tests considered but not order: N/A    Shared decision making:   Shared decision making was undertaken with the patient.  The patient understands and concurs with current treatment plan.    Code Status: Full Code    Social Determinants of Health that impacts treatment: Very strong social support system    Final diagnoses:   Nausea and vomiting,  unspecified vomiting type   Dehydration       ED Disposition  ED Disposition     ED Disposition   Discharge    Condition   Stable    Comment   --             Angi Braga MD  [address] [id number] [phone]      5-7 days         Medication List      New Prescriptions    dicyclomine 20 MG tablet  Commonly known as: BENTYL  Take 1 tablet by mouth Every 6 (Six) Hours As Needed (abdominal pain and /or diarrhea).     ondansetron ODT 4 MG disintegrating tablet  Commonly known as: ZOFRAN-ODT  Place 1 tablet on the tongue Every 6 (Six) Hours As Needed for Vomiting or Nausea.     promethazine 25 MG tablet  Commonly known as: PHENERGAN  Take 1 tablet by mouth Every 6 (Six) Hours As Needed for Nausea or Vomiting.           Where to Get Your Medications      These medications were sent to [facility] [id number] - [place], [place] - [address] AT [facility] [address] - [phone]  - [phone]   [address] [facility] [address]    Phone: [phone]   · dicyclomine 20 MG tablet  · ondansetron ODT 4 MG disintegrating tablet  · promethazine 25 MG tablet

## 2023-09-20 ENCOUNTER — OFFICE VISIT (OUTPATIENT)
Dept: OBSTETRICS AND GYNECOLOGY | Age: 23
End: 2023-09-20
Payer: COMMERCIAL

## 2023-09-20 VITALS
WEIGHT: 131 LBS | BODY MASS INDEX: 24.11 KG/M2 | HEIGHT: 62 IN | DIASTOLIC BLOOD PRESSURE: 76 MMHG | SYSTOLIC BLOOD PRESSURE: 122 MMHG

## 2023-09-20 DIAGNOSIS — Z83.2 FAMILY HISTORY OF FACTOR V LEIDEN MUTATION: ICD-10-CM

## 2023-09-20 DIAGNOSIS — R59.9 SWOLLEN LYMPH NODES: ICD-10-CM

## 2023-09-20 DIAGNOSIS — Z01.419 ENCOUNTER FOR GYNECOLOGICAL EXAMINATION WITHOUT ABNORMAL FINDING: ICD-10-CM

## 2023-09-20 RX ORDER — LEVONORGESTREL AND ETHINYL ESTRADIOL 0.1-0.02MG
1 KIT ORAL DAILY
Qty: 84 TABLET | Refills: 3 | Status: SHIPPED | OUTPATIENT
Start: 2023-09-20

## 2023-09-20 NOTE — PROGRESS NOTES
"Routine Annual Visit    2023    Patient: Elsa Thrasher          MR#:[id number]      Chief Complaint   Patient presents with    Gynecologic Exam     AE Today, Last AE 2022 insufficient cells, repeat pap 2022 (-)       History of Present Illness    22 y.o. female  who presents for annual exam. Doing well, on ocps wants to continue  Graduated from [employer] and working at [employer] now  No issues with SA    Last year around thanksgiving she had swollen inguinal lymph nodes. Lasted about a week. Had US then that was normal. Was not ill at the time and no recent vaccines.      Patient's last menstrual period was 2023.  Obstetric History:  OB History          0    Para   0    Term   0       0    AB   0    Living   0         SAB   0    IAB   0    Ectopic   0    Molar   0    Multiple   0    Live Births   0               Menstrual History:     Patient's last menstrual period was 2023.       ________________________________________  Patient Active Problem List   Diagnosis    Swollen lymph nodes       History reviewed. No pertinent past medical history.    Family History   Problem Relation Age of Onset    Hypertension Father     Factor V Leiden deficiency Father     Breast cancer Maternal Grandmother     Prostate cancer Maternal Grandfather     Ovarian cancer Neg Hx     Uterine cancer Neg Hx     Colon cancer Neg Hx        History reviewed. No pertinent surgical history.    Social History     Tobacco Use   Smoking Status Never   Smokeless Tobacco Never       has a current medication list which includes the following prescription(s): levonorgestrel-ethinyl estradiol.  ________________________________________      Review of Systems    Objective   Physical Exam    /76   Ht 157.5 cm (62\")   Wt 59.4 kg (131 lb)   LMP 2023   BMI 23.96 kg/m²    BP Readings from Last 3 Encounters:   23 122/76   05/15/23 115/73   22 138/82      Wt Readings from Last 3 Encounters: "   09/20/23 59.4 kg (131 lb)   05/15/23 54.4 kg (120 lb)   12/19/22 55.8 kg (123 lb)         BMI: Body mass index is 23.96 kg/m².       General:   alert, appears stated age, and cooperative   Neck: No thyromegaly or LAD   Heart:: regular rate and rhythm, S1, S2 normal, no murmur, click, rub or gallop   Lungs: normal respiratory effort and auscultation   Abdomen: soft, non-tender, without masses or organomegaly   Breast: inspection negative, no nipple discharge or bleeding, no masses or nodularity palpable   Urethra and bladder: urethral meatus normal; bladder nontender to palpation;   Vulva: normal, Bartholin's, Urethra, [place]'s normal   Vagina: normal mucosa, normal discharge, scant blood   Cervix: no lesions and nulliparous appearance   Uterus: normal size, non-tender, and anteverted   Adnexa: normal adnexa and no mass, fullness, tenderness       Assessment:    normal annual exam   Screening for cervical cancer  STI screening  Prior LAD  Fam hx FVL    Plan:    Plan     []  Mammogram request made  [x]  PAP done  []  Labs:   [x]  GC/Chl/TV  []  DEXA scan   []  Referral for colonoscopy:     No LAD noted today, will check cbc but if recurs may warrant CT scan.  Checking for FVL and if positive then would recommend nonestrogen contraception    Counseling  [x]  Nutrition  [x]  Physical activity/regular exercise   [x]  Healthy weight  []  Injury prevention  []  Smoking cessation  []  Substance misuse/abuse  [x]  Sexual behavior  [x]  STD prevention  [x]  Contraception  []  Dental health  []  Mental health  []  Immunization  [x]  Encouraged SBE        Dinorah Guzman MD  09/20/2023  12:15 EDT

## 2023-09-22 LAB
C TRACH RRNA CVX QL NAA+PROBE: NEGATIVE
CONV .: NORMAL
CYTOLOGIST CVX/VAG CYTO: NORMAL
CYTOLOGY CVX/VAG DOC CYTO: NORMAL
CYTOLOGY CVX/VAG DOC THIN PREP: NORMAL
DX ICD CODE: NORMAL
HIV 1 & 2 AB SER-IMP: NORMAL
N GONORRHOEA RRNA CVX QL NAA+PROBE: NEGATIVE
OTHER STN SPEC: NORMAL
STAT OF ADQ CVX/VAG CYTO-IMP: NORMAL
T VAGINALIS RRNA SPEC QL NAA+PROBE: NEGATIVE

## 2023-09-23 NOTE — PROGRESS NOTES
Please notify that her Pap was normal, there were no abnormal cells found. Also screening for gonorrhea, chlamydia, and trichomonas was negative    Dinorah Guzman MD  9/23/2023  07:17 EDT

## 2023-09-26 LAB
ERYTHROCYTE [DISTWIDTH] IN BLOOD BY AUTOMATED COUNT: 12.5 % (ref 12.3–15.4)
F5 P.R506Q BLD/T QL: NORMAL
HCT VFR BLD AUTO: 36.8 % (ref 34–46.6)
HGB BLD-MCNC: 12.1 G/DL (ref 12–15.9)
IMP & REVIEW OF LAB RESULTS: NORMAL
MCH RBC QN AUTO: 29.2 PG (ref 26.6–33)
MCHC RBC AUTO-ENTMCNC: 32.9 G/DL (ref 31.5–35.7)
MCV RBC AUTO: 88.9 FL (ref 79–97)
PLATELET # BLD AUTO: 325 10*3/MM3 (ref 140–450)
RBC # BLD AUTO: 4.14 10*6/MM3 (ref 3.77–5.28)
WBC # BLD AUTO: 2.95 10*3/MM3 (ref 3.4–10.8)

## 2024-08-12 RX ORDER — TIMOLOL MALEATE 5 MG/ML
1 SOLUTION/ DROPS OPHTHALMIC DAILY
Qty: 84 TABLET | Refills: 3 | Status: SHIPPED | OUTPATIENT
Start: 2024-08-12

## 2024-09-23 ENCOUNTER — OFFICE VISIT (OUTPATIENT)
Dept: OBSTETRICS AND GYNECOLOGY | Age: 24
End: 2024-09-23
Payer: COMMERCIAL

## 2024-09-23 VITALS
DIASTOLIC BLOOD PRESSURE: 74 MMHG | WEIGHT: 140 LBS | SYSTOLIC BLOOD PRESSURE: 132 MMHG | HEIGHT: 62 IN | BODY MASS INDEX: 25.76 KG/M2

## 2024-09-23 DIAGNOSIS — Z01.419 WELL WOMAN EXAM WITH ROUTINE GYNECOLOGICAL EXAM: ICD-10-CM

## 2024-09-23 DIAGNOSIS — Z20.2 POSSIBLE EXPOSURE TO STD: Primary | ICD-10-CM

## 2024-09-23 DIAGNOSIS — Z30.41 ENCOUNTER FOR SURVEILLANCE OF CONTRACEPTIVE PILLS: ICD-10-CM

## 2024-09-23 PROCEDURE — 99395 PREV VISIT EST AGE 18-39: CPT | Performed by: OBSTETRICS & GYNECOLOGY

## 2024-09-23 RX ORDER — LEVONORGESTREL/ETHIN.ESTRADIOL 0.1-0.02MG
1 TABLET ORAL DAILY
Qty: 84 TABLET | Refills: 3 | Status: SHIPPED | OUTPATIENT
Start: 2024-09-23

## 2024-09-25 LAB
C TRACH RRNA SPEC QL NAA+PROBE: POSITIVE
N GONORRHOEA RRNA SPEC QL NAA+PROBE: NEGATIVE
T VAGINALIS RRNA SPEC QL NAA+PROBE: NEGATIVE

## 2024-09-25 RX ORDER — DOXYCYCLINE 100 MG/1
100 CAPSULE ORAL 2 TIMES DAILY
Qty: 14 CAPSULE | Refills: 0 | Status: SHIPPED | OUTPATIENT
Start: 2024-09-25

## 2025-03-05 ENCOUNTER — OFFICE VISIT (OUTPATIENT)
Dept: FAMILY MEDICINE CLINIC | Facility: CLINIC | Age: 25
End: 2025-03-05
Payer: COMMERCIAL

## 2025-03-05 VITALS
DIASTOLIC BLOOD PRESSURE: 84 MMHG | BODY MASS INDEX: 25.65 KG/M2 | TEMPERATURE: 97.5 F | HEART RATE: 75 BPM | WEIGHT: 139.4 LBS | SYSTOLIC BLOOD PRESSURE: 124 MMHG | HEIGHT: 62 IN | OXYGEN SATURATION: 100 %

## 2025-03-05 DIAGNOSIS — Z83.2 FAMILY HISTORY OF FACTOR V LEIDEN MUTATION: ICD-10-CM

## 2025-03-05 DIAGNOSIS — Z00.00 HEALTHCARE MAINTENANCE: ICD-10-CM

## 2025-03-05 DIAGNOSIS — M65.4 DE QUERVAIN'S TENOSYNOVITIS, RIGHT: Primary | ICD-10-CM

## 2025-03-05 RX ORDER — IBUPROFEN 600 MG/1
TABLET, FILM COATED ORAL
Qty: 28 TABLET | Refills: 0 | Status: SHIPPED | OUTPATIENT
Start: 2025-03-05

## 2025-03-05 RX ORDER — KETOROLAC TROMETHAMINE 10 MG/1
10 TABLET, FILM COATED ORAL 4 TIMES DAILY
Qty: 20 TABLET | Refills: 0 | Status: SHIPPED | OUTPATIENT
Start: 2025-03-05 | End: 2025-03-05

## 2025-03-05 NOTE — PROGRESS NOTES
Chief Complaint  Establish Care (Pt is establishing care, not fasting, pt would like to discuss rt wrist pain) and Wrist Pain (Pt states pain started in nov. Pt is unaware of any injury and stated pain just started randomly. )    Subjective        Elsa Thrasher presents to [facility]  History of Present Illness  History of Present Illness  The patient presents to establish care and for evaluation of right wrist pain.    She has been experiencing intermittent right wrist pain for several months, localized on the thumb side of the wrist. The onset of the pain was gradual, initially resembling discomfort from an awkward sleeping position, although she does not recall any such incident. The pain has since migrated downwards and has remained stable in its current location for some time. As a right-handed individual, she experiences discomfort during activities such as note-taking and scrolling on her phone. She has intermittently observed some slight swelling of the affected area compared to the left side. The pain is triggered by thumb movement or touch but reports no loss of sensation, numbness, or tingling in her thumb or fingers.  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 She does not recall any specific injury that could have caused the pain. The pain has been progressively worsening over the past few weeks and was previously more severe upon waking up, but currently, it is more pronounced with movement or touch. She has attempted self-management with intermittent ibuprofen and icing.  Says her mother also gave her a thumb spica splint to try, but notes it was perhaps too small or too tight, because the hard part of the splint seem to be pressing on and exacerbating the area of pain.  So she did not try the splint again.    She is generally healthy with  "no past diagnoses other than a Chlamydia infection treated in 09/2024.   She is under the care of an OB-GYN, with her last pap smear in 09/2023.   She has not undergone any procedures or surgeries, including wisdom teeth extraction.   Her father has history of factor V Leiden mutation and blood clots in the past.  Patient denies ever being tested for this genetic mutation and denies personal history of blood clots  She is a G0  Her only medication is a birth control pill, which she takes consistently.   Her menstrual cycles are regular with the pill, lasting about 4 days, and she reports no heavy bleeding or excessive cramping.  Reports history of regular periods prior to starting the pill.     SOCIAL HISTORY  She is a  in sports engineering.    FAMILY HISTORY  Her father has factor V Leiden mutation and has had blood clots as a result of it.    MEDICATIONS  Birth control pill        Objective   Vital Signs:  /84   Pulse 75   Temp 97.5 °F (36.4 °C)   Ht 157.5 cm (62\")   Wt 63.2 kg (139 lb 6.4 oz)   SpO2 100%   BMI 25.50 kg/m²   Estimated body mass index is 25.5 kg/m² as calculated from the following:    Height as of this encounter: 157.5 cm (62\").    Weight as of this encounter: 63.2 kg (139 lb 6.4 oz).          Physical Exam  Constitutional:       General: She is not in acute distress.     Appearance: Normal appearance. She is not ill-appearing.   HENT:      Head: Normocephalic and atraumatic.   Eyes:      Extraocular Movements: Extraocular movements intact.   Cardiovascular:      Rate and Rhythm: Normal rate.   Pulmonary:      Effort: Pulmonary effort is normal.   Musculoskeletal:      Right wrist: Tenderness and bony tenderness present. No swelling, deformity or snuff box tenderness.      Left wrist: Normal.      Comments: R wrist ttp over CMC joint and overlying and surrounding radial styloid process; positive Finkelstein's test; there is ttp induced with tinel's test, but no " "radiation of pain or neuropathy sx induced with Tinel's   Neurological:      Mental Status: She is alert.        Physical Exam      Result Review :         Results             Assessment and Plan   Diagnoses and all orders for this visit:    1. De Quervain's tenosynovitis, right (Primary)  -The symptoms and physical examination findings are consistent with de Quervain's tenosynovitis. She has been advised to abstain from activities that exacerbate the condition, such as heavy lifting and weight training, for a period of at least 2 weeks. The use of a thumb spica splint has been recommended, to be worn as frequently as possible, including during sleep. Frequent icing of the affected area has also been suggested. A prescription for an anti-inflammatory medication, to be taken 4 times daily for 5 consecutive days, has been provided. If there is no improvement in symptoms after 2 weeks, an x-ray will be considered to further evaluate the joint. If arthritis is identified, a steroid injection into the joint may be administered.  -Note that a prescription for p.o. ketorolac was initially sent in, but after patient left, I received a call from the pharmacist stating \"the  will not allow filling of p.o. ketorolac prescription unless an IM injection was given first\".  I did tell the pharmacist I have prescribed this many times without a preceding IM injection, and this is the first time I have heard this feedback.  Regardless, pharmacist refused to fill.  Therefore, sent in ibuprofen prescription instead and voicemail left for patient notifying her of the change    -     Discontinue: ketorolac (TORADOL) 10 MG tablet; Take 1 tablet by mouth 4 (Four) Times a Day for 5 days.  Dispense: 20 tablet; Refill: 0  -     ibuprofen (ADVIL,MOTRIN) 600 MG tablet; Take 1 tab every 6-8 hours x 7 days consistently, on day 8 reduce to 1 tab QID prn pain  Dispense: 28 tablet; Refill: 0    2. Family history of factor V Leiden " mutation  -Since known thrombogenic mutations are a contraindication for combined oral contraceptives, the patient is currently on, when we do lab studies in 1 month (see #3) should consider genetic testing for factor V Leiden mutation    3. Healthcare maintenance  -Patient to follow-up on right wrist pain in 1 month, at which time advised we can plan to do routine lab studies  -OB notes reviewed, patient up-to-date on Pap smear. The next Pap smear is due in September 2026.    Assessment & Plan           Follow Up   Return in about 1 month (around 4/5/2025) for f/u De Quervain's Synovitis, get labs.  Patient was given instructions and counseling regarding her condition or for health maintenance advice. Please see specific information pulled into the AVS if appropriate.     Patient or patient representative verbalized consent for the use of Ambient Listening during the visit with  Nelly Jacob MD for chart documentation. 3/5/2025  18:25 EST

## 2025-04-09 ENCOUNTER — OFFICE VISIT (OUTPATIENT)
Dept: FAMILY MEDICINE CLINIC | Facility: CLINIC | Age: 25
End: 2025-04-09
Payer: COMMERCIAL

## 2025-04-09 ENCOUNTER — HOSPITAL ENCOUNTER (OUTPATIENT)
Dept: GENERAL RADIOLOGY | Facility: HOSPITAL | Age: 25
Discharge: HOME | End: 2025-04-09
Admitting: STUDENT IN AN ORGANIZED HEALTH CARE EDUCATION/TRAINING PROGRAM
Payer: COMMERCIAL

## 2025-04-09 VITALS
HEART RATE: 51 BPM | OXYGEN SATURATION: 100 % | DIASTOLIC BLOOD PRESSURE: 90 MMHG | BODY MASS INDEX: 25.51 KG/M2 | HEIGHT: 62 IN | TEMPERATURE: 97.5 F | SYSTOLIC BLOOD PRESSURE: 120 MMHG | WEIGHT: 138.6 LBS

## 2025-04-09 DIAGNOSIS — Z00.00 HEALTHCARE MAINTENANCE: ICD-10-CM

## 2025-04-09 DIAGNOSIS — S01.331A PIERCED EAR INFECTION, RIGHT, INITIAL ENCOUNTER: ICD-10-CM

## 2025-04-09 DIAGNOSIS — Z83.2 FAMILY HISTORY OF FACTOR V LEIDEN MUTATION: ICD-10-CM

## 2025-04-09 DIAGNOSIS — M25.531 RIGHT WRIST PAIN: ICD-10-CM

## 2025-04-09 DIAGNOSIS — L08.9 PIERCED EAR INFECTION, RIGHT, INITIAL ENCOUNTER: ICD-10-CM

## 2025-04-09 DIAGNOSIS — M65.4 DE QUERVAIN'S TENOSYNOVITIS, RIGHT: Primary | ICD-10-CM

## 2025-04-09 PROCEDURE — 73110 X-RAY EXAM OF WRIST: CPT

## 2025-04-09 RX ORDER — MUPIROCIN 20 MG/G
1 OINTMENT TOPICAL 3 TIMES DAILY
Qty: 15 G | Refills: 0 | Status: SHIPPED | OUTPATIENT
Start: 2025-04-09

## 2025-04-09 NOTE — PROGRESS NOTES
"Chief Complaint  De Quervain's Tenosynovitis (1mo f/u) and Follow-up (Pt is here for 1mo f/u on the previous dx, she has no new complaints for today's visit. )    Subjective        Elsa Thrasher presents to [facility]  History of Present Illness  History of Present Illness    The patient presents for follow-up on de Quervain's synovitis and an infected R ear piercing.    #De Quervain's Synovitis  - Persistent hand and wrist pain, unchanged since the last visit  - Pain is less noticeable with a brace but worsens during activities like showering or typing  - Discontinued weightlifting and applies ice intermittently for temporary relief  - Wears the brace daily, including during sleep  - She stopped taking notes by hand, which she was doing frequently, and only types her notes now, which she is able to do while wearing the brace  - Ibuprofen supply is exhausted    #Infected Ear Piercing  - Infection at a recent ear piercing site, initially improved but recurred  - Managing with warm compresses and regular cleaning, no significant change  - Initially experienced itching, now no pain or pus drainage  - No topical treatments applied  - Suspects irritation from long-term earrings      Objective   Vital Signs:  /90   Pulse 51   Temp 97.5 °F (36.4 °C)   Ht 157.5 cm (62\")   Wt 62.9 kg (138 lb 9.6 oz)   SpO2 100%   BMI 25.35 kg/m²   Estimated body mass index is 25.35 kg/m² as calculated from the following:    Height as of this encounter: 157.5 cm (62\").    Weight as of this encounter: 62.9 kg (138 lb 9.6 oz).          Physical Exam  Constitutional:       General: She is not in acute distress.     Appearance: Normal appearance. She is not ill-appearing.   HENT:      Head: Normocephalic and atraumatic.   Eyes:      Extraocular Movements: Extraocular movements intact.   Cardiovascular:      Rate and Rhythm: Normal rate.   Pulmonary:      Effort: Pulmonary effort is normal.   Skin:     " Findings: Lesion (4mm firm, skin-colored papule overlying R ear piercing w/ no appreciable erythema, induration, or drainage; non-tender to palpation) present.   Neurological:      Mental Status: She is alert.        Physical Exam  The patient's hand strength is intact.    Result Review :         Results             Assessment and Plan   Diagnoses and all orders for this visit:    1. De Quervain's tenosynovitis, right (Primary)  2. Right wrist pain  - Patient endorses strict adherence to all conservative management recommendations made at office visit 1 month ago with pain, essentially, unchanged and persisting on a daily basis  - Advised we will further evaluate with imaging.  Continue to have highest suspicion for DeQuervain's tenosynovitis.  However, will obtain x-ray to further evaluate joint followed by MRI  - See #4.  Considered p.o. steroids today, but given patient's factor V Leiden status is unknown and she is currently taking oral contraceptives, will await testing prior to consideration of glucocorticoids to avoid increasing risk of VTE  - Will refer to hand surgery for further evaluation and treatment and consideration of glucocorticoid injection, pending factor V Leiden results    -     CBC & Differential  -     Comprehensive Metabolic Panel  -     TSH Rfx On Abnormal To Free T4  -     XR Wrist 3+ View Right  -     Ambulatory Referral to Hand Surgery  -     Ambulatory Referral to Occupational Therapy for Evaluation & Treatment  -     MRI wrist right wo contrast    3. Pierced ear infection, right, initial encounter  -Patient advised to continue frequent warm compresses, followed by gentle massage of the area  - Counseled to apply mupirocin tid and avoid use of the earrings that caused this reaction    -     CBC & Differential  -     Comprehensive Metabolic Panel  -     mupirocin (BACTROBAN) 2 % ointment; Apply 1 Application topically to the appropriate area as directed 3 (Three) Times a Day.  Dispense:  15 g; Refill: 0    4. Family history of factor V Leiden mutation  -As noted on establish care visit 1 month ago, patient's father has factor V Leiden mutation and she has never been tested  -Patient advised should evaluate status of factor V Leiden due to fact that she is currently on combined oral contraceptives    -     Factor 5 Leiden    5. Healthcare maintenance  -     Hepatitis C Antibody    Assessment & Plan           Follow Up   Return in about 2 months (around 6/9/2025) for Annual physical.  Patient was given instructions and counseling regarding her condition or for health maintenance advice. Please see specific information pulled into the AVS if appropriate.     Patient or patient representative verbalized consent for the use of Ambient Listening during the visit with  Nelly Jacob MD for chart documentation. 4/9/2025  09:01 EDT

## 2025-04-14 LAB
ALBUMIN SERPL-MCNC: 4.1 G/DL (ref 4–5)
ALP SERPL-CCNC: 47 IU/L (ref 44–121)
ALT SERPL-CCNC: 17 IU/L (ref 0–32)
AST SERPL-CCNC: 21 IU/L (ref 0–40)
BASOPHILS # BLD AUTO: 0 X10E3/UL (ref 0–0.2)
BASOPHILS NFR BLD AUTO: 1 %
BILIRUB SERPL-MCNC: 1.2 MG/DL (ref 0–1.2)
BUN SERPL-MCNC: 12 MG/DL (ref 6–20)
BUN/CREAT SERPL: 15 (ref 9–23)
CALCIUM SERPL-MCNC: 9.6 MG/DL (ref 8.7–10.2)
CHLORIDE SERPL-SCNC: 102 MMOL/L (ref 96–106)
CO2 SERPL-SCNC: 23 MMOL/L (ref 20–29)
CREAT SERPL-MCNC: 0.79 MG/DL (ref 0.57–1)
EGFRCR SERPLBLD CKD-EPI 2021: 107 ML/MIN/1.73
EOSINOPHIL # BLD AUTO: 0.1 X10E3/UL (ref 0–0.4)
EOSINOPHIL NFR BLD AUTO: 2 %
ERYTHROCYTE [DISTWIDTH] IN BLOOD BY AUTOMATED COUNT: 11.4 % (ref 11.7–15.4)
F5 P.R506Q BLD/T QL: NORMAL
GLOBULIN SER CALC-MCNC: 3.2 G/DL (ref 1.5–4.5)
GLUCOSE SERPL-MCNC: 82 MG/DL (ref 70–99)
HCT VFR BLD AUTO: 39 % (ref 34–46.6)
HCV IGG SERPL QL IA: NON REACTIVE
HGB BLD-MCNC: 12.8 G/DL (ref 11.1–15.9)
IMM GRANULOCYTES # BLD AUTO: 0 X10E3/UL (ref 0–0.1)
IMM GRANULOCYTES NFR BLD AUTO: 0 %
IMP & REVIEW OF LAB RESULTS: NORMAL
LYMPHOCYTES # BLD AUTO: 1.3 X10E3/UL (ref 0.7–3.1)
LYMPHOCYTES NFR BLD AUTO: 32 %
MCH RBC QN AUTO: 30 PG (ref 26.6–33)
MCHC RBC AUTO-ENTMCNC: 32.8 G/DL (ref 31.5–35.7)
MCV RBC AUTO: 91 FL (ref 79–97)
MONOCYTES # BLD AUTO: 0.5 X10E3/UL (ref 0.1–0.9)
MONOCYTES NFR BLD AUTO: 11 %
NEUTROPHILS # BLD AUTO: 2.2 X10E3/UL (ref 1.4–7)
NEUTROPHILS NFR BLD AUTO: 54 %
PLATELET # BLD AUTO: 331 X10E3/UL (ref 150–450)
POTASSIUM SERPL-SCNC: 4.3 MMOL/L (ref 3.5–5.2)
PROT SERPL-MCNC: 7.3 G/DL (ref 6–8.5)
RBC # BLD AUTO: 4.27 X10E6/UL (ref 3.77–5.28)
SODIUM SERPL-SCNC: 139 MMOL/L (ref 134–144)
TSH SERPL DL<=0.005 MIU/L-ACNC: 1.7 UIU/ML (ref 0.45–4.5)
WBC # BLD AUTO: 4 X10E3/UL (ref 3.4–10.8)

## 2025-04-16 ENCOUNTER — HOSPITAL ENCOUNTER (OUTPATIENT)
Dept: MRI IMAGING | Facility: HOSPITAL | Age: 25
Discharge: HOME | End: 2025-04-16
Admitting: STUDENT IN AN ORGANIZED HEALTH CARE EDUCATION/TRAINING PROGRAM
Payer: COMMERCIAL

## 2025-04-16 DIAGNOSIS — M65.4 DE QUERVAIN'S TENOSYNOVITIS, RIGHT: ICD-10-CM

## 2025-04-16 DIAGNOSIS — M25.531 RIGHT WRIST PAIN: ICD-10-CM

## 2025-04-16 PROCEDURE — 73221 MRI JOINT UPR EXTREM W/O DYE: CPT

## 2025-05-15 ENCOUNTER — HOSPITAL ENCOUNTER (OUTPATIENT)
Dept: OCCUPATIONAL THERAPY | Facility: HOSPITAL | Age: 25
Setting detail: THERAPIES SERIES
Discharge: HOME | End: 2025-05-15
Payer: COMMERCIAL

## 2025-05-15 DIAGNOSIS — M25.531 RIGHT WRIST PAIN: ICD-10-CM

## 2025-05-15 DIAGNOSIS — M65.4 DE QUERVAIN'S TENOSYNOVITIS: Primary | ICD-10-CM

## 2025-05-15 PROCEDURE — 97166 OT EVAL MOD COMPLEX 45 MIN: CPT

## 2025-05-15 NOTE — THERAPY EVALUATION
" Outpatient Occupational Therapy Neuro Initial Evaluation  [facility]     Patient Name: Elsa Thrasher  : 2000  MRN: [id number]  Today's Date: 5/15/2025      Visit Date: 05/15/2025    Patient Active Problem List   Diagnosis    Swollen lymph nodes    Family history of factor V Leiden mutation        No past medical history on file.     No past surgical history on file.      Visit Dx:      ICD-10-CM ICD-9-CM   1. De Quervain's tenosynovitis  M65.4 727.04   2. Right wrist pain  M25.531 719.43        Patient History       Row Name 05/15/25 1200             History    Chief Complaint Pain;Difficulty with daily activities  -      Type of Pain Wrist pain  -      Date Current Problem(s) Began --  2024  -      Patient/Caregiver Goals Relieve pain;Return to prior level of function;Improve mobility;Improve strength  -      Patient's Rating of General Health Very good  -      Hand Dominance right-handed  -      Patient seeing anyone else for problem(s)? --  No, referal to hand specalist in chart  -      What clinical tests have you had for this problem? MRI  -                User Key  (r) = Recorded By, (t) = Taken By, (c) = Cosigned By      Initials Name Provider Type    Yanira Hirsch, NICOLE/L, CSRS Occupational Therapist                     OT Neuro       Row Name 05/15/25 1100             Precautions and Contraindications    Precautions/Limitations no known precautions/limitations  -         Subjective Pain    Able to rate subjective pain? yes  -SM      Pre-Treatment Pain Level 7  -SM      Subjective Pain Comment \"thobbing\" R radial styloid  -SM         Sensation    Sensation WNL? WNL  -SM         General ROM    Right Hand Thumb CM Extension  -SM      Left Hand Thumb CM Extension  -SM      GENERAL ROM COMMENTS Otherwise R hand/wrist, L hand/wrist ROM WFL and symmetrical  -SM         Right Thumb    RT Thumb CM Extension AROM 10  -SM         Left Thumb    LT Thumb CM Extension AROM 70  -SM      "    ADL Assessment/Intervention    45784 - OT Self Care/Mgmt Minutes --  independent ADLs, iADLs,  at [facility]  -                User Key  (r) = Recorded By, (t) = Taken By, (c) = Cosigned By      Initials Name Provider Type    Yanira Hirsch, OTR/L, CSRS Occupational Therapist                    Hand Therapy (Last 24 Hours)       Hand Eval       Row Name 05/15/25 1100             Hand  Strength     Strength Affected Side Bilateral  -SM          Strength Right    # Reps 3  -SM      Right Rung 2  -SM      Right  Test 1 65  -SM      Right  Test 2 45  -SM      Right  Test 3 45  -SM       Strength Average Right 51.67  -SM          Strength Left    # Reps 3  -SM      Left Rung 2  -SM      Left  Test 1 45  -SM      Left  Test 2 45  -SM      Left  Test 3 48  -SM       Strength Average Left 46  -SM         Pinch Strength    Affected Side Bilateral  -SM         Right Hand Strength - Pinch (lbs)    Lateral 7.5 lbs  -SM      Tip (2 point) 1 lbs  -SM         Left Hand Strength - Pinch (lbs)    Lateral 6 lbs  -SM      Tip (2 point) 1 lbs  -SM                User Key  (r) = Recorded By, (t) = Taken By, (c) = Cosigned By      Initials Name Provider Type    Yanira Hirsch OTR/L, CSRS Occupational Therapist                        Therapy Education  Education Details: HEP, OT goals, POC  Given: HEP  Program: New  How Provided: Verbal, Demonstration, Written  Provided to: Patient  Level of Understanding: Verbalized  17906 - OT Self Care/Mgmt Minutes:  (independent ADLs, iADLs,  at [facility])     OT Goals       Row Name 05/15/25 1300          OT Short Term Goals    STG Date to Achieve 06/14/25  -     STG 1 Pt to be independent with inital phase of recovery home exercises including AROM, tendon gliding, gentle strengthening.  -     STG 1 Progress New;Ongoing  -     STG 2 Pt to be independent with adaptive techniques for ADLs, iADLs, school related  tasks to decrease pain symptoms.  -     STG 2 Progress New;Ongoing  -     STG 3 Pt to improve R CMC extension to 50 degrees to increase ROM to complete daily tasks.  -     STG 3 Progress New;Ongoing  -        Long Term Goals    LTG Date to Achieve 07/13/25  -     LTG 1 Pt to be independent with advanced HEP for stability and strengthening of R wrist, thumb.  -     LTG 1 Progress New;Ongoing  -     LTG 2 Pt to improve R CMC extension MMT to 4/5 to increase strength to complete daily tasks.  -     LTG 2 Progress New;Ongoing  -     LTG 3 Pt to decrease R wrist pain < or =2/10 during ADL routine or 10 min fine motor task.  -     LTG 3 Progress New;Ongoing  -     LTG 4 Pt to improve QuickDash disability score to < or = 15 to illustrate improvement in self reported ability to use RUE and reduce pain symptoms.  -     LTG 4 Progress New;Ongoing  -        Time Calculation    OT Goal Re-Cert Due Date 06/14/25  -               User Key  (r) = Recorded By, (t) = Taken By, (c) = Cosigned By      Initials Name Provider Type    Yanira Hirsch, OTR/L, CSRS Occupational Therapist                            OT Assessment/Plan       Row Name 05/15/25 1316          OT Assessment    Functional Limitations Performance in self-care ADL;Performance in leisure activities;Other (comment)  school activities  -     Impairments Endurance;Pain;Range of motion  -     Assessment Comments Pt is a 24 y.o female who presents to OP OT for R wrist pain, dx of De Quervain's tenosynovitis. Pt has been recommended by PCP for activity modifications, R thumb spica splint, rest, ice, pain relief OTC medications with no relief per pt in the past month. Pt reports pain with most functional activities and reports pain 7/10 upon arrival after completing her morning routine in prep to therapy today. Pt reports at best pain gets down to 4/10 and at worse can be 10/10. Pt with positive Finkelstein test. Pt has poor extension of  R thumb.  and pinch are WFL but pt does fatique quickly with activity. Pt would benefit from ongoing OT to address deficits with R wrist pain, R thumb ROM, activity modication/adaption, improvement in functional activity tolerance.  -     Please refer to paper survey for additional self-reported information Yes  -SM     OT Diagnosis De Quervain's tenosynovitis, Right  -SM     OT Rehab Potential Good  -SM     Patient/caregiver participated in establishment of treatment plan and goals Yes  -SM     Patient would benefit from skilled therapy intervention Yes  -SM        OT Plan    OT Frequency 1x/week  -SM     Predicted Duration of Therapy Intervention (OT) 6-8 weeks  -SM     Planned CPT's? OT EVAL MOD COMPLEXITY: 69365;OT THER ACT EA 15 MIN: 36788ID;OT THER PROC EA 15 MIN: 60744EM;OT NEUROMUSC RE EDUCATION EA 15 MIN: 25094;OT SELF CARE/MGMT/TRAIN 15 MIN: 04241;OT HOT/COLD PACK;OT ULTRASOUND EA 15 MIN: 14877  -SM               User Key  (r) = Recorded By, (t) = Taken By, (c) = Cosigned By      Initials Name Provider Type    Yanira Hirsch OTR/L, CSRS Occupational Therapist                   OT Exercises       Row Name 05/15/25 1100             Exercise 1    Exercise Name 1 R CMC AROM- extension, abd, flex.  -SM      Cueing 1 Verbal;Demo  -SM      Sets 1 1  -SM      Reps 1 10  -SM         Exercise 2    Exercise Name 2 R CMC strengthening with rubber band for resistance- thumb ext, abd  -SM      Cueing 2 Verbal;Demo  -SM      Sets 2 1  -SM      Reps 2 10  -SM         Exercise 3    Exercise Name 3 Radial/ulnar deviation stretch against edge of table.  -SM      Cueing 3 Verbal;Demo  -SM      Sets 3 1  -SM      Reps 3 10  -SM                User Key  (r) = Recorded By, (t) = Taken By, (c) = Cosigned By      Initials Name Provider Type    Yanira Hirsch OTR/L, CSRS Occupational Therapist                      Outcome Measure Options: 9 Hole Peg, Quick DASH  9 Hole Peg  9-Hole Peg Left: 20 seconds  9-Hole  Peg Right: 17 seconds  Quick DASH  Open a tight or new jar.: Moderate Difficulty  Do heavy household chores (e.g., wash walls, wash floors): Severe Difficulty  Carry a shopping bag or briefcase: Moderate Difficulty  Wash your back: Moderate Difficulty  Use a knife to cut food: No Difficulty  Recreational activities in which you take some force or impact through your arm, should or hand (e.g. golf, hammering, tennis, etc.): No Difficulty  During the past week, to what extent has your arm, shoulder, or hand problem interfered with your normal social activites with family, friends, neighbors or groups?: Moderately  During the past week, were you limited in your work or other regular daily activities as a result of your arm, shoulder or hand problem?: Not limited at all  Arm, Shoulder, or hand pain: Severe  Tingling (pins and needles) in your arm, shoulder, or hand: Mild  During the past week, how much difficulty have you had sleeping because of the pain in your arm, shoulder or hand?: No difficulty  Number of Questions Answered: 11  Quick DASH Score: 34.09         Time Calculation:   OT Start Time: 1100  OT Stop Time: 1145  OT Time Calculation (min): 45 min  Timed Charges  89930 - OT Self Care/Mgmt Minutes:  (independent ADLs, iADLs,  at [facility])  Untimed Charges  OT Eval/Re-eval Minutes: 45  Total Minutes  Untimed Charges Total Minutes: 45   Total Minutes: 45     Therapy Charges for Today       Code Description Service Date Service Provider Modifiers Qty    94962190006 HC OT EVAL MOD COMPLEXITY 3 5/15/2025 Yanira Martinez OTR/L, CSRS GO 1                       NICOLE Brown/L, CSRS  5/15/2025

## 2025-05-20 ENCOUNTER — HOSPITAL ENCOUNTER (OUTPATIENT)
Dept: OCCUPATIONAL THERAPY | Facility: HOSPITAL | Age: 25
Setting detail: THERAPIES SERIES
Discharge: HOME | End: 2025-05-20
Payer: COMMERCIAL

## 2025-05-20 DIAGNOSIS — M25.531 RIGHT WRIST PAIN: ICD-10-CM

## 2025-05-20 DIAGNOSIS — M65.4 DE QUERVAIN'S TENOSYNOVITIS: Primary | ICD-10-CM

## 2025-05-20 PROCEDURE — 97018 PARAFFIN BATH THERAPY: CPT

## 2025-05-20 PROCEDURE — 97530 THERAPEUTIC ACTIVITIES: CPT

## 2025-05-20 NOTE — THERAPY TREATMENT NOTE
Outpatient Occupational Therapy Neuro Treatment Note  [facility]     Patient Name: Elsa Thrasher  : 2000  MRN: [id number]  Today's Date: 2025       Visit Date: 2025    Patient Active Problem List   Diagnosis    Swollen lymph nodes    Family history of factor V Leiden mutation        No past medical history on file.     No past surgical history on file.      Visit Dx:    ICD-10-CM ICD-9-CM   1. De Quervain's tenosynovitis  M65.4 727.04   2. Right wrist pain  M25.531 719.43                    OT Assessment/Plan       Row Name 25 1234          OT Assessment    Assessment Comments Pt continues to report pain R thumb/wrist and reports she has been compliant with exercise program and brace over the past week. Pt with chronic inflammation and pain ongoing for ~6 months. Parrafin added today for pain management technique/deep heat in prep to exercises. No skin intergrity issues noted. Also K Tape applied for support from R DIP to forearm and discussed wearing schedule. Discussed calling hand specialist as OT checked referal in chart and it appears an attempt to call the patient was made and unable to get a hold of. Pt continues to report 5/10 pain this week.  -SM               User Key  (r) = Recorded By, (t) = Taken By, (c) = Cosigned By      Initials Name Provider Type    Yanira Hirsch OTR/L, CSRS Occupational Therapist                               Therapy Education  Education Details: provided upgraded HEP, alternating ice/heat for pain relief  Given: HEP, Pain management  Program: Progressed  How Provided: Verbal, Demonstration, Written  Provided to: Patient  Level of Understanding: Verbalized, Demonstrated     Modalities       Row Name 25 1100             Parrafin    Paraffin 56464 Location R thumb  -SM      Rx Minutes 7  -SM                User Key  (r) = Recorded By, (t) = Taken By, (c) = Cosigned By      Initials Name Provider Type    Yanira Hirsch OTR/CONSUELO, CSRS  Occupational Therapist                   OT Exercises       Row Name 05/20/25 1200             Precautions    Existing Precautions/Restrictions no known precautions/restrictions  -SM         Subjective Pain    Able to rate subjective pain? yes  -SM      Pre-Treatment Pain Level 5  -SM      Post-Treatment Pain Level 5  -SM      Subjective Pain Comment R radial styoild  -SM         Exercise 1    Exercise Name 1 R CMC AROM- extension, abd, flex.  -SM      Cueing 1 Verbal  -SM      Sets 1 1  -SM      Reps 1 10  -SM         Exercise 2    Exercise Name 2 R CMC strengthening with rubber band for resistance- thumb ext, abd  -SM      Cueing 2 Verbal  -SM      Sets 2 1  -SM      Reps 2 10  -SM         Exercise 3    Exercise Name 3 Radial/ulnar deviation stretch on incline wedge  -SM      Cueing 3 Verbal  -SM      Sets 3 1  -SM      Reps 3 10  -SM         Exercise 4    Exercise Name 4 Radial/ulnar deviation, wrist flex, wrist ext on incline wedge, maintaining grasp of therbar for light resistance.  -SM      Cueing 4 Verbal  -SM      Sets 4 1  -SM      Reps 4 10  -SM         Exercise 5    Exercise Name 5 R thumb IP flex/ext  -SM      Sets 5 1  -SM      Reps 5 10  -SM         Exercise 6    Exercise Name 6 towel squeezes  -SM      Cueing 6 Verbal  -SM      Sets 6 1  -SM      Reps 6 10  -SM                User Key  (r) = Recorded By, (t) = Taken By, (c) = Cosigned By      Initials Name Provider Type    SM Yanira Martinez OTR/L, MARYAS Occupational Therapist                                Time Calculation:   OT Start Time: 1100  OT Stop Time: 1138  OT Time Calculation (min): 38 min  Timed Charges  48022 - OT Therapeutic Activity Minutes: 30  Total Minutes  Timed Charges Total Minutes: 30   Total Minutes: 30     Therapy Charges for Today       Code Description Service Date Service Provider Modifiers Qty    82564085665  OT THERAPEUTIC ACT EA 15 MIN 5/20/2025 Yanira Martinez OTR/L, CSRS GO 2    [id number]  OT PARAFFIN BATH  5/20/2025 Yanira Martinez, OTR/L, CSRS GO 1                      Yanira Martinez, OTR/CONSUELO, CSRS  5/20/2025

## 2025-05-27 ENCOUNTER — HOSPITAL ENCOUNTER (OUTPATIENT)
Dept: OCCUPATIONAL THERAPY | Facility: HOSPITAL | Age: 25
Setting detail: THERAPIES SERIES
Discharge: HOME | End: 2025-05-27
Payer: COMMERCIAL

## 2025-05-27 DIAGNOSIS — M25.531 RIGHT WRIST PAIN: ICD-10-CM

## 2025-05-27 DIAGNOSIS — M65.4 DE QUERVAIN'S TENOSYNOVITIS: Primary | ICD-10-CM

## 2025-05-27 PROCEDURE — 97110 THERAPEUTIC EXERCISES: CPT

## 2025-05-27 PROCEDURE — 97018 PARAFFIN BATH THERAPY: CPT

## 2025-05-27 NOTE — THERAPY TREATMENT NOTE
Outpatient Occupational Therapy Neuro Treatment Note  [facility]     Patient Name: Elsa Thrasher  : 2000  MRN: [id number]  Today's Date: 2025       Visit Date: 2025    Patient Active Problem List   Diagnosis    Swollen lymph nodes    Family history of factor V Leiden mutation        No past medical history on file.     No past surgical history on file.      Visit Dx:    ICD-10-CM ICD-9-CM   1. De Quervain's tenosynovitis  M65.4 727.04   2. Right wrist pain  M25.531 719.43                    OT Assessment/Plan       Row Name 25 1256          OT Assessment    Assessment Comments Pt reports some slight decrease in pain today compared to last session but pain continues to be a barrier for progression of exercise and hand tolerance. Pt reports KTape provided some relief this past week and OT reapplied today for support from R DIP to forearm. Pt also reports parrafin helpful in prep to exercises. Completed again today for pain management technique/deep heat in prep exercise tolerance. Pt reports she has not contacted hand specialist yet. OT encourages pt to complete as minimally improved symptoms with exercise, resting, ice/heat, bracing.  -SM               User Key  (r) = Recorded By, (t) = Taken By, (c) = Cosigned By      Initials Name Provider Type    Yanira Hirsch OTR/L, CSRS Occupational Therapist                               Therapy Education  Education Details: added isometric exercises today  Given: HEP  Program: Progressed  How Provided: Verbal, Demonstration, Written  Provided to: Patient  Level of Understanding: Verbalized, Demonstrated     Modalities       Row Name 25 1100             Parrafin    Paraffin 11573 Location R thumb  -SM      Rx Minutes 8  -SM                User Key  (r) = Recorded By, (t) = Taken By, (c) = Cosigned By      Initials Name Provider Type    Yanira Hirsch OTR/L, CSRS Occupational Therapist                   OT Exercises       Row Name  05/27/25 1100             Precautions    Existing Precautions/Restrictions no known precautions/restrictions  -SM         Subjective Pain    Able to rate subjective pain? yes  -SM      Pre-Treatment Pain Level 4  -SM      Subjective Pain Comment R radial styloid  -SM         Exercise 1    Exercise Name 1 R CMC AROM- extension, abd, flex.  -SM      Cueing 1 Verbal  -SM      Sets 1 2  -SM      Reps 1 10  -SM         Exercise 2    Exercise Name 2 R CMC strengthening with rubber band for resistance- thumb ext, abd  -SM      Cueing 2 Verbal  -SM      Sets 2 2  -SM      Reps 2 10  -SM         Exercise 3    Exercise Name 3 Radial/ulnar deviation stretch on incline wedge  -SM      Cueing 3 Verbal  -SM      Sets 3 1  -SM      Reps 3 10  -SM         Exercise 4    Exercise Name 4 Radial/ulnar deviation, wrist flex, wrist ext on incline wedge, maintaining grasp of therbar for light resistance.  -SM      Cueing 4 Verbal  -SM      Sets 4 2  -SM      Reps 4 10  -SM         Exercise 5    Exercise Name 5 gross grasp- green foam block  -SM      Cueing 5 Verbal  -SM      Sets 5 2  -SM      Reps 5 10  -SM         Exercise 6    Exercise Name 6 isometric holds- CMC ext, abd  -SM      Cueing 6 Verbal  -SM      Sets 6 2  -SM      Reps 6 10  -SM                User Key  (r) = Recorded By, (t) = Taken By, (c) = Cosigned By      Initials Name Provider Type    SM Yanira Martinez OTR/L, CSRS Occupational Therapist                                Time Calculation:   OT Start Time: 1100  OT Stop Time: 1138  OT Time Calculation (min): 38 min  Timed Charges  05724 - OT Therapeutic Exercise Minutes: 30  Total Minutes  Timed Charges Total Minutes: 30   Total Minutes: 30     Therapy Charges for Today       Code Description Service Date Service Provider Modifiers Qty    71615787237 HC OT THER PROC EA 15 MIN 5/27/2025 Yanira Martinez OTR/L, CSRS GO 2    01303267307 HC OT PARAFFIN BATH 5/27/2025 Yanira Martinez OTR/L, CSRS GO 1                       Yanira Martinez, OTR/L, CSRS  5/27/2025

## 2025-06-03 ENCOUNTER — HOSPITAL ENCOUNTER (OUTPATIENT)
Dept: OCCUPATIONAL THERAPY | Facility: HOSPITAL | Age: 25
Setting detail: THERAPIES SERIES
Discharge: HOME | End: 2025-06-03
Payer: COMMERCIAL

## 2025-06-03 DIAGNOSIS — M25.531 RIGHT WRIST PAIN: ICD-10-CM

## 2025-06-03 DIAGNOSIS — M65.4 DE QUERVAIN'S TENOSYNOVITIS: Primary | ICD-10-CM

## 2025-06-03 PROCEDURE — 97110 THERAPEUTIC EXERCISES: CPT

## 2025-06-03 PROCEDURE — 97035 APP MDLTY 1+ULTRASOUND EA 15: CPT

## 2025-06-03 PROCEDURE — 97140 MANUAL THERAPY 1/> REGIONS: CPT

## 2025-06-03 NOTE — THERAPY TREATMENT NOTE
Outpatient Occupational Therapy Neuro Treatment Note  [facility]     Patient Name: Elsa Thrasher  : 2000  MRN: [id number]  Today's Date: 6/3/2025       Visit Date: 2025    Patient Active Problem List   Diagnosis    Swollen lymph nodes    Family history of factor V Leiden mutation        No past medical history on file.     No past surgical history on file.      Visit Dx:    ICD-10-CM ICD-9-CM   1. De Quervain's tenosynovitis  M65.4 727.04   2. Right wrist pain  M25.531 719.43                    OT Assessment/Plan       Row Name 25 1227          OT Assessment    Assessment Comments Pt tolerates session well. She continues to report pain R wrist/thumb but has decreased reported pain score from start of treatment. Pulsed ultrasound added to treatment plan today with 50% duty cycle with goals to increase elasticity and breakdown scar tissue. Pt also tolerates scraping/glides forearm to R thumb. Provided tool to complete at home. Continued to encourage reaching back out to hand specialist which pt reports she will do this week. Pt does report some relief with Ktape and applied again this session.  -SM               User Key  (r) = Recorded By, (t) = Taken By, (c) = Cosigned By      Initials Name Provider Type    Yanira Hirsch, MAEGANR/L, CSRS Occupational Therapist                               Therapy Education  Education Details: OT provided orthoplast  and educated pt to complete IASTM with HEP.  Given: Symptoms/condition management  Program: New  How Provided: Verbal, Demonstration  Provided to: Patient  Level of Understanding: Verbalized, Demonstrated     Modalities       Row Name 25 1200             Ultrasound 51677    Location first dorsal compartment R hand  -SM      Duty Cycle 50  -SM      Intensity - Wts/cm 0.8  -SM      04146 - OT Ultrasound Minutes 8  -SM                User Key  (r) = Recorded By, (t) = Taken By, (c) = Cosigned By      Initials Name Provider Type    EVONNE  Yanira Martinez, OTR/L, CSRS Occupational Therapist                   OT Exercises       Row Name 06/03/25 1200 06/03/25 1100          Precautions    Existing Precautions/Restrictions no known precautions/restrictions  -SM --        Subjective Pain    Able to rate subjective pain? yes  -SM --     Pre-Treatment Pain Level 3  -SM --     Post-Treatment Pain Level 3  -SM --     Subjective Pain Comment R radial styloid  -SM --        Exercise 1    Exercise Name 1 R CMC AROM- extension, abd, flex.  -SM --  -SM     Cueing 1 Verbal  -SM --        Exercise 2    Exercise Name 2 R CMC strengthening with therapy web  -SM --     Cueing 2 Verbal  -SM --        Exercise 3    Exercise Name 3 R wrist short arc ROM all planes. AAROM  -SM --     Cueing 3 Verbal  -SM --        Exercise 4    Exercise Name 4 K tape for support R wrist.  -SM --     Cueing 4 Verbal  -SM --        Exercise 5    Exercise Name 5 IASTM thumb to forearm along volar/dorsal compartments.  -SM --     Cueing 5 Verbal  -SM --               User Key  (r) = Recorded By, (t) = Taken By, (c) = Cosigned By      Initials Name Provider Type     Yanira Martinez, OTR/L, CSRS Occupational Therapist                  Manual Rx (Last 36 Hours)       Manual Treatments       Row Name 06/03/25 1230             Total Minutes    13249 - OT Manual Therapy Minutes 15  -SM                User Key  (r) = Recorded By, (t) = Taken By, (c) = Cosigned By      Initials Name Provider Type     Yanira Martinez, OTR/L, CSRS Occupational Therapist                              Time Calculation:   OT Start Time: 1100  OT Stop Time: 1138  OT Time Calculation (min): 38 min  Timed Charges  85204 - OT Ultrasound Minutes: 8  52409 - OT Therapeutic Exercise Minutes: 15  82384 - OT Manual Therapy Minutes: 15  Total Minutes  Timed Charges Total Minutes: 38   Total Minutes: 38     Therapy Charges for Today       Code Description Service Date Service Provider Modifiers Qty    28298081203  OT THER  PROC EA 15 MIN 6/3/2025 Yanira Martinez, OTR/L, CSRS GO 1    83002112238 HC OT MANUAL THERAPY EA 15 MIN 6/3/2025 Yanira Martinez, OTR/L, CSRS GO 1    45034783797 HC OT ULTRASOUND EA 15 MIN 6/3/2025 Yanira Martinez, OTR/L, CSRS GO 1                      Yanira Martinez OTR/L, CSRS  6/3/2025

## 2025-06-09 ENCOUNTER — OFFICE VISIT (OUTPATIENT)
Dept: FAMILY MEDICINE CLINIC | Facility: CLINIC | Age: 25
End: 2025-06-09
Payer: COMMERCIAL

## 2025-06-09 VITALS
HEART RATE: 76 BPM | OXYGEN SATURATION: 99 % | HEIGHT: 62 IN | WEIGHT: 142.8 LBS | TEMPERATURE: 95.1 F | SYSTOLIC BLOOD PRESSURE: 130 MMHG | DIASTOLIC BLOOD PRESSURE: 78 MMHG | BODY MASS INDEX: 26.28 KG/M2

## 2025-06-09 DIAGNOSIS — J30.2 SEASONAL ALLERGIC RHINITIS, UNSPECIFIED TRIGGER: ICD-10-CM

## 2025-06-09 DIAGNOSIS — M65.4 DE QUERVAIN'S TENOSYNOVITIS, RIGHT: ICD-10-CM

## 2025-06-09 DIAGNOSIS — G44.219 EPISODIC TENSION-TYPE HEADACHE, NOT INTRACTABLE: ICD-10-CM

## 2025-06-09 DIAGNOSIS — Z00.00 ENCOUNTER FOR ROUTINE ADULT HEALTH EXAMINATION WITHOUT ABNORMAL FINDINGS: Primary | ICD-10-CM

## 2025-06-09 DIAGNOSIS — J02.9 SORE THROAT: ICD-10-CM

## 2025-06-09 LAB
EXPIRATION DATE: NORMAL
INTERNAL CONTROL: NORMAL
Lab: NORMAL
S PYO AG THROAT QL: NEGATIVE

## 2025-06-09 PROCEDURE — 87880 STREP A ASSAY W/OPTIC: CPT | Performed by: STUDENT IN AN ORGANIZED HEALTH CARE EDUCATION/TRAINING PROGRAM

## 2025-06-09 PROCEDURE — 99395 PREV VISIT EST AGE 18-39: CPT | Performed by: STUDENT IN AN ORGANIZED HEALTH CARE EDUCATION/TRAINING PROGRAM

## 2025-06-09 NOTE — PROGRESS NOTES
Chief Complaint  Annual Exam (Pt is present for physical exam, no new complaints.)    Subjective        Elsa Thrasher presents to [facility]  History of Present Illness  History of Present Illness    The patient presents for an annual physical exam, follow-up on de Quervain's tenosynovitis, and to c/o new throat discomfort .    #Social Hx  Says on summer break from grad studies, no summer classes, on break until Aug 18  She went to [place] with friends in May, but otherwise just planning on relaxing and recovering this summer    #APE  Exercise: does leg weights, core days, full body; and does stair-master or treadmill, exercises on avg 4 days/week  Diet: Eats fruits, bagel, eggs and turkey sep[place]a every morning; her mom cooks dinner and has things like pork chops, mac n' cheese, steamed broccoli, eats salads or makes a sandwich for lunch   Dentist: just went a few mo ago and back again in Oct  Vision: says doesn't wear glasses or contacts currently, but is having more frequent HA recently and is thinking she needs to get her vision checked  Pap due: 09/2026    #Vaccinations  - She believes she is up-to-date with her vaccinations, having received them from her pediatrician (Dr. Whitlock) before transitioning to her OB-GYN.  - She does not have access to her vaccine record.  - Her last vaccination was a COVID-19 booster administered a few years ago.     #De Quervain's tenosynovitis  - She has been undergoing OT for de Quervain's tenosynovitis, with the final session scheduled for tomorrow.  - She did receive a phone call about hand surgery referral, but intentionally did not follow-up yet, as she wanted to complete her OT first  - She has not engaged in weightlifting due to her condition but continues other exercises.  - OT Stretches have provided some relief, allowing for increased thumb mobility, but she continues to experience pain.  - Symptoms also exacerbated by holding phone and right  "hand, so she tries to hold phone and left hand as much as possible  - She wears her brace or splint throughout the day.    #Throat Pressure  - She has been experiencing intermittent throat pressure for the past two weeks, which is not associated with pain upon swallowing.  - The sensation typically occurs in the morning and evening and resolves spontaneously.  - She describes it as similar to the onset of a sore throat.  - She also reports ear popping and mild nasal drainage but does not experience significant nasal congestion.  - She has a history of spring allergies and has been taking over-the-counter allergy medication.  - The throat pressure was progressively worsening last week but has remained stable over the weekend and today.  - She has not experienced any fever or cough.        Objective   Vital Signs:  /78   Pulse 76   Temp 95.1 °F (35.1 °C)   Ht 157.5 cm (62\")   Wt 64.8 kg (142 lb 12.8 oz)   SpO2 99%   BMI 26.12 kg/m²   Estimated body mass index is 26.12 kg/m² as calculated from the following:    Height as of this encounter: 157.5 cm (62\").    Weight as of this encounter: 64.8 kg (142 lb 12.8 oz).          Physical Exam  Constitutional:       General: She is not in acute distress.     Appearance: Normal appearance. She is not ill-appearing.   HENT:      Head: Normocephalic and atraumatic.      Right Ear: Tympanic membrane, ear canal and external ear normal. There is no impacted cerumen.      Left Ear: Tympanic membrane, ear canal and external ear normal. There is no impacted cerumen.      Nose: Congestion (mild congestion nasal mucosa) present. No rhinorrhea (clear nasal dc present).      Mouth/Throat:      Mouth: Mucous membranes are moist.      Pharynx: Oropharynx is clear. No oropharyngeal exudate or posterior oropharyngeal erythema.      Comments: Symmetrically enlarged tonsils without exudate or tonsillolith  Eyes:      General:         Right eye: No discharge.         Left eye: No " discharge.      Conjunctiva/sclera: Conjunctivae normal.   Cardiovascular:      Rate and Rhythm: Normal rate and regular rhythm.      Heart sounds: Normal heart sounds. No murmur heard.  Pulmonary:      Effort: Pulmonary effort is normal. No respiratory distress.      Breath sounds: Normal breath sounds. No stridor. No wheezing, rhonchi or rales.   Lymphadenopathy:      Cervical: No cervical adenopathy.   Neurological:      Mental Status: She is alert.        Physical Exam      Result Review :    CMP          4/9/2025    09:19   CMP   Glucose 82    BUN 12    Creatinine 0.79    EGFR 107    Sodium 139    Potassium 4.3    Chloride 102    Calcium 9.6    Total Protein 7.3    Albumin 4.1    Globulin 3.2    Total Bilirubin 1.2    Alkaline Phosphatase 47    AST (SGOT) 21    ALT (SGPT) 17    BUN/Creatinine Ratio 15      CBC          4/9/2025    09:19   CBC   WBC 4.0    RBC 4.27    Hemoglobin 12.8    Hematocrit 39.0    MCV 91    MCH 30.0    MCHC 32.8    RDW 11.4    Platelets 331        TSH          4/9/2025    09:19   TSH   TSH 1.700             Results               Assessment and Plan   Diagnoses and all orders for this visit:    1. Encounter for routine adult health examination without abnormal findings (Primary)  - Commended patient on healthy diet and exercise routine  - She is up-to-date on Pap smear, next due 09/2026 (obtained through OB)  - UTD on dentist, see #2, encouraged vision testing  - Iz: Discussed immunization record not showing previous HPV or Tdap vaccines.  Patient believes she is up-to-date on HPV through her pediatrician, but does not have records.  Willing to fill out request of records  She is willing to get Tdap today.  This was discussed, but then patient brought up sore throat at end of visit, at which time we did not [place] back to Tdap shot  - A [employer] message was sent to patient with information on scheduling a nursing visit with our clinic to get Tdap or to obtain at any pharmacy    2. Episodic  tension-type headache, not intractable  - Patient previously wore glasses for nearsightedness, but eventually self discontinued.  She is getting more frequent headaches and suspects vision may be underlying cause  - Counseled to get vision testing and, if vision normal but headaches persist, to come back to see me in clinic for further evaluation    3. De Quervain's tenosynovitis, right  - Completed all recommended conservative management strategies (physical therapy, anti-inflammatories, splints) but continues to experience pain  - Marginal improvement with stretches but significant pain persists, particularly with thumb movement  - Importance of seeing a hand surgeon for further evaluation and potential glucocorticoid injection discussed  - Message sent to referrals department to see if repeat referral order needed or if hand surgery referral can be opened back up    4. Sore throat  -Due to reported throat discomfort and enlarged tonsils, rapid strep was collected today, which was negative  -Since she endorses reported allergy symptoms with postnasal drip, advised managing as allergic rhinitis, see below    -     POC Rapid Strep A    5. Seasonal allergic rhinitis, unspecified trigger  - Patient counseled okay to continue daily antihistamine, but strongly recommend adding on an intranasal corticosteroid, such as fluticasone.  This medication not covered in patient's formulary, so advised purchasing it over-the-counter and using 2 sprays each nostril once daily while symptomatic, then may decrease to 1 spray each nostril once daily.  Proper administration of medication reviewed with patient.    Assessment & Plan           Follow Up   Return in about 1 year (around 6/9/2026) for Annual Physical or sooner as needed.  Patient was given instructions and counseling regarding her condition or for health maintenance advice. Please see specific information pulled into the AVS if appropriate.     Patient or patient  representative verbalized consent for the use of Ambient Listening during the visit with  Nelly Jacob MD for chart documentation. 6/9/2025  18:29 EDT

## 2025-06-10 ENCOUNTER — HOSPITAL ENCOUNTER (OUTPATIENT)
Dept: OCCUPATIONAL THERAPY | Facility: HOSPITAL | Age: 25
Setting detail: THERAPIES SERIES
Discharge: HOME | End: 2025-06-10
Payer: COMMERCIAL

## 2025-06-10 DIAGNOSIS — M65.4 DE QUERVAIN'S TENOSYNOVITIS: Primary | ICD-10-CM

## 2025-06-10 DIAGNOSIS — M25.531 RIGHT WRIST PAIN: ICD-10-CM

## 2025-06-10 PROCEDURE — 97035 APP MDLTY 1+ULTRASOUND EA 15: CPT

## 2025-06-10 PROCEDURE — 97140 MANUAL THERAPY 1/> REGIONS: CPT

## 2025-06-10 PROCEDURE — 97530 THERAPEUTIC ACTIVITIES: CPT

## 2025-06-10 NOTE — THERAPY DISCHARGE NOTE
Outpatient Occupational Therapy Neuro Treatment Note/Discharge Summary  [facility]     Patient Name: Elsa Thrasher  : 2000  MRN: [id number]  Today's Date: 6/10/2025      Visit Date: 06/10/2025    Patient Active Problem List   Diagnosis    Swollen lymph nodes    Family history of factor V Leiden mutation        No past medical history on file.     No past surgical history on file.      Visit Dx:    ICD-10-CM ICD-9-CM   1. De Quervain's tenosynovitis  M65.4 727.04   2. Right wrist pain  M25.531 719.43        OT Neuro       Row Name 06/10/25 1100             General ROM    Right Hand Thumb CM ABDuction  -SM         Right Thumb    RT Thumb CM Extension AROM 60  -SM      RT Thumb CM ABDuction AROM 60  -SM                User Key  (r) = Recorded By, (t) = Taken By, (c) = Cosigned By      Initials Name Provider Type    Yanira Hirsch OTR/L, CSRS Occupational Therapist                    Hand Therapy (Last 24 Hours)       Hand Eval       Row Name 06/10/25 1100              Strength Right    # Reps 3  -SM      Right Rung 2  -SM      Right  Test 1 50  -SM      Right  Test 2 46  -SM      Right  Test 3 49  -SM       Strength Average Right 48.33  -SM          Strength Left    # Reps 3  -SM      Left Rung 2  -SM      Left  Test 1 55  -SM      Left  Test 2 54  -SM      Left  Test 3 45  -SM       Strength Average Left 51.33  -SM         Right Hand Strength - Pinch (lbs)    Lateral 8 lbs  -SM      Tip (2 point) 2 lbs  -SM         Left Hand Strength - Pinch (lbs)    Lateral 5 lbs  -SM      Tip (2 point) 1 lbs  -SM                User Key  (r) = Recorded By, (t) = Taken By, (c) = Cosigned By      Initials Name Provider Type    Yanira Hirsch OTR/L, CSRS Occupational Therapist                         OT Assessment/Plan       Row Name 06/10/25 1222          OT Assessment    Assessment Comments Pt is seen for 4 OP treatment sessions + evaluation with treatment including pain  relief techniques, ultrasound for tissue elasticity, K taping, AAROM thumb, tendon glides, gentle strengthening/isometric exercises, manual techniques. Pt has also followed recommendations for thumb spica splint, rest/activity modification. Pt has participated in HEP. Pt reports some relief in symptoms and reports no pain at rest (only early in the mornings when just waking up) otherwise pain during use of R hand. OT strongly recommends f/u with hand specialist. Continue HEP at this time. OT demonstrated pt how to perform K taping and soft tissue mobilization. Her /pinch scores are relatively the same. She has shown signficant improvement with CMC extension from 10 degrees to 60 degrees. Pt with +10 point improvement with quick DASH.  -               User Key  (r) = Recorded By, (t) = Taken By, (c) = Cosigned By      Initials Name Provider Type    Yanira Hirsch, OTR/L, CSRS Occupational Therapist                      OT Goals       Row Name 06/10/25 1100          OT Short Term Goals    STG 1 Pt to be independent with inital phase of recovery home exercises including AROM, tendon gliding, gentle strengthening.  -     STG 1 Progress Met  -     STG 2 Pt to be independent with adaptive techniques for ADLs, iADLs, school related tasks to decrease pain symptoms.  -     STG 2 Progress Met  -     STG 3 Pt to improve R CMC extension to 50 degrees to increase ROM to complete daily tasks.  -     STG 3 Progress Met  -        Long Term Goals    LTG 1 Pt to be independent with advanced HEP for stability and strengthening of R wrist, thumb.  -     LTG 1 Progress Partially Met  progressed HEP but minimal tolerance for strengthening 2/2 pain.  -     LTG 2 Pt to improve R CMC extension MMT to 4/5 to increase strength to complete daily tasks.  -     LTG 2 Progress Partially Met  progressed but not met, 4-/5 MMT  -     LTG 3 Pt to decrease R wrist pain < or =2/10 during ADL routine or 10 min fine motor  task.  -SM     LTG 3 Progress Partially Met  improved but not met  -SM     LTG 4 Pt to improve QuickDash disability score to < or = 15 to illustrate improvement in self reported ability to use RUE and reduce pain symptoms.  -SM     LTG 4 Progress Partially Met  improved 10 points  -SM               User Key  (r) = Recorded By, (t) = Taken By, (c) = Cosigned By      Initials Name Provider Type    Yanira Hirsch, OTR/L, CSRS Occupational Therapist                          Modalities       Row Name 06/10/25 1100             Ultrasound 40010    Location first dorsal compartment R hand  -SM      Duty Cycle 50  -SM      Frequency 3.0 MHz  -SM      Intensity - Wts/cm 0.8  -SM      31213 - OT Ultrasound Minutes 8  -SM                User Key  (r) = Recorded By, (t) = Taken By, (c) = Cosigned By      Initials Name Provider Type    Yanira Hirsch, OTR/L, CSRS Occupational Therapist                     OT Exercises       Row Name 06/10/25 1100             Precautions    Existing Precautions/Restrictions no known precautions/restrictions  -SM         Subjective Pain    Able to rate subjective pain? yes  -SM      Pre-Treatment Pain Level 3  -SM      Post-Treatment Pain Level 3  -SM      Subjective Pain Comment R radial styloid  -SM         Exercise 1    Exercise Name 1 R CMC AROM- extension, abd, flex.  -SM      Cueing 1 Verbal  -SM         Exercise 2    Exercise Name 2 R forearm to thumb proximal to distal forearm to thumb scraping soft tissue massage.  -SM      Cueing 2 Tactile  -SM         Exercise 3    Exercise Name 3 K tape applied with support to thumb.  -SM      Cueing 3 Verbal;Demo  -SM                User Key  (r) = Recorded By, (t) = Taken By, (c) = Cosigned By      Initials Name Provider Type    Yanira Hirsch, OTR/L, CSRS Occupational Therapist                    Manual Rx (Last 36 Hours)       Manual Treatments       Row Name 06/10/25 1227             Total Minutes    65196 - OT Manual Therapy  Minutes 8  -SM                User Key  (r) = Recorded By, (t) = Taken By, (c) = Cosigned By      Initials Name Provider Type     Yanira Martinez OTR/L, CSRS Occupational Therapist                    Quick DASH  Open a tight or new jar.: Moderate Difficulty  Do heavy household chores (e.g., wash walls, wash floors): Moderate Difficulty  Carry a shopping bag or briefcase: Moderate Difficulty  Wash your back: Mild Difficulty  Use a knife to cut food: No Difficulty  Recreational activities in which you take some force or impact through your arm, should or hand (e.g. golf, hammering, tennis, etc.): Severe Difficulty  During the past week, to what extent has your arm, shoulder, or hand problem interfered with your normal social activites with family, friends, neighbors or groups?: Not at all  During the past week, were you limited in your work or other regular daily activities as a result of your arm, shoulder or hand problem?: Not limited at all  Arm, Shoulder, or hand pain: Mild  Tingling (pins and needles) in your arm, shoulder, or hand: None  During the past week, how much difficulty have you had sleeping because of the pain in your arm, shoulder or hand?: No difficulty  Number of Questions Answered: 11  Quick DASH Score: 25         Time Calculation:   OT Start Time: 1059  OT Stop Time: 1138  OT Time Calculation (min): 39 min  Timed Charges  80099 - OT Ultrasound Minutes: 8  68860 - OT Manual Therapy Minutes: 8  46909 - OT Therapeutic Activity Minutes: 23  Total Minutes  Timed Charges Total Minutes: 39   Total Minutes: 39     Therapy Charges for Today       Code Description Service Date Service Provider Modifiers Qty    65959667733 HC OT ULTRASOUND EA 15 MIN 6/10/2025 Yanira Martinez OTR/L, CSRS GO 1    93100286041 HC OT MANUAL THERAPY EA 15 MIN 6/10/2025 Yanira Martinez OTR/L, CSRS GO 1    88651835786 HC OT THERAPEUTIC ACT EA 15 MIN 6/10/2025 Yanira Martinez OTR/L, CSRS GO 1                  OP OT  Discharge Summary  Date of Discharge: 06/10/25  Outcomes Achieved: Patient able to partially acheive established goals  Discharge Destination: Home with home program        NICOLE Brown/CONSUELO, CSRS  6/10/2025